# Patient Record
Sex: FEMALE | Race: WHITE | NOT HISPANIC OR LATINO | Employment: FULL TIME | URBAN - NONMETROPOLITAN AREA
[De-identification: names, ages, dates, MRNs, and addresses within clinical notes are randomized per-mention and may not be internally consistent; named-entity substitution may affect disease eponyms.]

---

## 2017-01-05 ENCOUNTER — HOSPITAL ENCOUNTER (OUTPATIENT)
Dept: RADIOLOGY | Facility: HOSPITAL | Age: 62
Discharge: HOME | End: 2017-01-05
Attending: PODIATRIST
Payer: COMMERCIAL

## 2017-01-05 ENCOUNTER — TRANSCRIBE ORDERS (OUTPATIENT)
Dept: ADMINISTRATIVE | Facility: HOSPITAL | Age: 62
End: 2017-01-05

## 2017-01-05 DIAGNOSIS — M79.671 RIGHT FOOT PAIN: ICD-10-CM

## 2017-01-05 DIAGNOSIS — M79.671 RIGHT FOOT PAIN: Primary | ICD-10-CM

## 2017-01-05 PROCEDURE — 73630 X-RAY EXAM OF FOOT: CPT

## 2017-04-18 ENCOUNTER — ALLSCRIPTS OFFICE VISIT (OUTPATIENT)
Dept: OTHER | Facility: OTHER | Age: 62
End: 2017-04-18

## 2017-04-19 ENCOUNTER — TRANSCRIBE ORDERS (OUTPATIENT)
Dept: ADMINISTRATIVE | Facility: HOSPITAL | Age: 62
End: 2017-04-19

## 2017-04-19 DIAGNOSIS — R10.9 ABDOMINAL PAIN, OTHER SPECIFIED SITE: Primary | ICD-10-CM

## 2017-04-19 DIAGNOSIS — R10.9 ABDOMINAL PAIN: ICD-10-CM

## 2017-04-20 ENCOUNTER — APPOINTMENT (OUTPATIENT)
Dept: LAB | Facility: HOSPITAL | Age: 62
End: 2017-04-20
Attending: SURGERY
Payer: COMMERCIAL

## 2017-04-20 DIAGNOSIS — R10.9 ABDOMINAL PAIN: ICD-10-CM

## 2017-04-20 LAB
ANION GAP SERPL CALCULATED.3IONS-SCNC: 8 MMOL/L (ref 4–13)
BUN SERPL-MCNC: 15 MG/DL (ref 5–25)
CALCIUM SERPL-MCNC: 8.4 MG/DL (ref 8.3–10.1)
CHLORIDE SERPL-SCNC: 107 MMOL/L (ref 100–108)
CO2 SERPL-SCNC: 28 MMOL/L (ref 21–32)
CREAT SERPL-MCNC: 0.72 MG/DL (ref 0.6–1.3)
GFR SERPL CREATININE-BSD FRML MDRD: >60 ML/MIN/1.73SQ M
GLUCOSE SERPL-MCNC: 98 MG/DL (ref 65–140)
POTASSIUM SERPL-SCNC: 4.4 MMOL/L (ref 3.5–5.3)
SODIUM SERPL-SCNC: 143 MMOL/L (ref 136–145)

## 2017-04-20 PROCEDURE — 36415 COLL VENOUS BLD VENIPUNCTURE: CPT

## 2017-04-20 PROCEDURE — 80048 BASIC METABOLIC PNL TOTAL CA: CPT

## 2017-04-25 ENCOUNTER — HOSPITAL ENCOUNTER (OUTPATIENT)
Dept: CT IMAGING | Facility: HOSPITAL | Age: 62
Discharge: HOME | End: 2017-04-25
Attending: SURGERY
Payer: COMMERCIAL

## 2017-04-25 DIAGNOSIS — R10.9 ABDOMINAL PAIN: ICD-10-CM

## 2017-04-25 PROCEDURE — 74177 CT ABD & PELVIS W/CONTRAST: CPT

## 2017-04-25 RX ADMIN — IOHEXOL 100 ML: 350 INJECTION, SOLUTION INTRAVENOUS at 08:34

## 2017-04-26 ENCOUNTER — GENERIC CONVERSION - ENCOUNTER (OUTPATIENT)
Dept: OTHER | Facility: OTHER | Age: 62
End: 2017-04-26

## 2017-05-24 ENCOUNTER — ALLSCRIPTS OFFICE VISIT (OUTPATIENT)
Dept: OTHER | Facility: OTHER | Age: 62
End: 2017-05-24

## 2017-06-05 ENCOUNTER — ALLSCRIPTS OFFICE VISIT (OUTPATIENT)
Dept: OTHER | Facility: OTHER | Age: 62
End: 2017-06-05

## 2017-06-05 DIAGNOSIS — I10 ESSENTIAL (PRIMARY) HYPERTENSION: ICD-10-CM

## 2017-06-05 DIAGNOSIS — Z00.00 ENCOUNTER FOR GENERAL ADULT MEDICAL EXAMINATION WITHOUT ABNORMAL FINDINGS: ICD-10-CM

## 2017-06-13 ENCOUNTER — TRANSCRIBE ORDERS (OUTPATIENT)
Dept: SLEEP CENTER | Facility: HOSPITAL | Age: 62
End: 2017-06-13

## 2017-06-13 DIAGNOSIS — G47.33 OBSTRUCTIVE SLEEP APNEA (ADULT) (PEDIATRIC): Primary | ICD-10-CM

## 2017-06-29 ENCOUNTER — HOSPITAL ENCOUNTER (OUTPATIENT)
Dept: SLEEP CENTER | Facility: HOSPITAL | Age: 62
Discharge: HOME | End: 2017-06-29
Attending: INTERNAL MEDICINE
Payer: COMMERCIAL

## 2017-06-29 DIAGNOSIS — G47.33 OBSTRUCTIVE SLEEP APNEA (ADULT) (PEDIATRIC): ICD-10-CM

## 2017-07-02 ENCOUNTER — TRANSCRIBE ORDERS (OUTPATIENT)
Dept: ADMINISTRATIVE | Facility: HOSPITAL | Age: 62
End: 2017-07-02

## 2017-07-02 ENCOUNTER — APPOINTMENT (OUTPATIENT)
Dept: LAB | Facility: HOSPITAL | Age: 62
End: 2017-07-02
Attending: INTERNAL MEDICINE
Payer: COMMERCIAL

## 2017-07-02 DIAGNOSIS — Z00.00 ENCOUNTER FOR GENERAL ADULT MEDICAL EXAMINATION WITHOUT ABNORMAL FINDINGS: ICD-10-CM

## 2017-07-02 DIAGNOSIS — I10 ESSENTIAL (PRIMARY) HYPERTENSION: ICD-10-CM

## 2017-07-02 LAB
CHOLEST SERPL-MCNC: 217 MG/DL (ref 50–200)
EST. AVERAGE GLUCOSE BLD GHB EST-MCNC: 111 MG/DL
HBA1C MFR BLD: 5.5 % (ref 4.2–6.3)
HDLC SERPL-MCNC: 72 MG/DL (ref 40–60)
LDLC SERPL CALC-MCNC: 118 MG/DL (ref 0–100)
TRIGL SERPL-MCNC: 134 MG/DL

## 2017-07-02 PROCEDURE — 83036 HEMOGLOBIN GLYCOSYLATED A1C: CPT

## 2017-07-02 PROCEDURE — 80061 LIPID PANEL: CPT

## 2017-07-02 PROCEDURE — 36415 COLL VENOUS BLD VENIPUNCTURE: CPT

## 2017-07-06 ENCOUNTER — TRANSCRIBE ORDERS (OUTPATIENT)
Dept: SLEEP CENTER | Facility: HOSPITAL | Age: 62
End: 2017-07-06

## 2017-07-06 DIAGNOSIS — G47.33 OBSTRUCTIVE SLEEP APNEA (ADULT) (PEDIATRIC): Primary | ICD-10-CM

## 2017-11-15 ENCOUNTER — GENERIC CONVERSION - ENCOUNTER (OUTPATIENT)
Dept: OTHER | Facility: OTHER | Age: 62
End: 2017-11-15

## 2017-12-07 ENCOUNTER — ALLSCRIPTS OFFICE VISIT (OUTPATIENT)
Dept: OTHER | Facility: OTHER | Age: 62
End: 2017-12-07

## 2017-12-08 NOTE — PROGRESS NOTES
Assessment    1  Screening for depression (V79 0) (Z13 89)   2  Never a smoker   3  Hypertension (401 9) (I10)   4  Anxiety (300 00) (F41 9)   5  Obstructive sleep apnea (327 23) (G47 33)    Plan  Anxiety    · ALPRAZolam 0 5 MG Oral Tablet; one po daily as needed  Screening for depression    · *VB-Depression Screening; Status:Complete;   Done: [id number] 03:24PM    Discussion/Summary  Discussion Summary:   Refill alprazolam which pt takes sparingly  She is waiting as able for hernia repair  Reviewed labs  Rx mammo  Rto 6 months  Medication SE Review and Pt Understands Tx: Possible side effects of new medications were reviewed with the patient/guardian today  The treatment plan was reviewed with the patient/guardian  The patient/guardian understands and agrees with the treatment plan    Self Referrals:   Self Referrals: No      Chief Complaint  Chief Complaint Free Text Note Form: Pt presents for routine f/up exam  Pt feels well today and without complaint  No falls  Asking for refill of alprazolam  Appetite is normal per patient  Chief Complaint Chronic Condition [facility]: Patient is here today for follow up of chronic conditions described in HPI  History of Present Illness  HPI: Pt doing ok  Feels wellbutrin has been helpful  She saw sx regarding hernia but not ready for sx now  Using cpap daily with benefit  No respiratory sxs  Review of Systems  Complete-Female:  Constitutional: No fever, no chills, feels well, no tiredness, no recent weight gain or weight loss  Eyes: No complaints of eye pain, no red eyes, no eyesight problems, no discharge, no dry eyes, no itching of eyes  ENT: no complaints of earache, no loss of hearing, no nose bleeds, no nasal discharge, no sore throat, no hoarseness  Cardiovascular: No complaints of slow heart rate, no fast heart rate, no chest pain, no palpitations, no leg claudication, no lower extremity edema  Respiratory: shortness of breath during exertion  Gastrointestinal: No complaints of abdominal pain, no constipation, no nausea or vomiting, no diarrhea, no bloody stools  Genitourinary: No complaints of dysuria, no incontinence, no pelvic pain, no dysmenorrhea, no vaginal discharge or bleeding  Musculoskeletal: No complaints of arthralgias, no myalgias, no joint swelling or stiffness, no limb pain or swelling  Integumentary: No complaints of skin rash or lesions, no itching, no skin wounds, no breast pain or lump  Neurological: No complaints of headache, no confusion, no convulsions, no numbness, no dizziness or fainting, no tingling, no limb weakness, no difficulty walking  Psychiatric: Not suicidal, no sleep disturbance, no anxiety or depression, no change in personality, no emotional problems  Endocrine: No complaints of proptosis, no hot flashes, no muscle weakness, no deepening of the voice, no feelings of weakness  Hematologic/Lymphatic: No complaints of swollen glands, no swollen glands in the neck, does not bleed easily, does not bruise easily  Active Problems  1  Abdominal pain (789 00) (R10 9)   2  Anxiety (300 00) (F41 9)   3  Counseling regarding advanced directives (V65 49) (Z71 89)   4  Encounter for screening mammogram for malignant neoplasm of breast (V76 12) (Z12 31)   5  Esophageal reflux (530 81) (K21 9)   6  Hypertension (401 9) (I10)   7  Nausea and vomiting (787 01) (R11 2)   8  Nephrolithiasis (592 0) (N20 0)   9  No advance directives (V49 89) (Z78 9)   10  Obstructive sleep apnea (327 23) (G47 33)   11  Osteoporosis (733 00) (M81 0)   12  Thyroid nodule (241 0) (E04 1)   13  Umbilical hernia (249 1) (K42 9)    Past Medical History  1  History of Abdominal bloating (787 3) (R14 0)   2  History of hyperglycemia (V12 29) (Z86 39)   3  History of Medullary sponge kidney (753 17) (Q61 5)  Active Problems And Past Medical History Reviewed: The active problems and past medical history were reviewed and updated today        Surgical Allergies    Vitals  Vital Signs    Recorded: [id number] 09:01PM Recorded: [id number] 03:18PM   Temperature  95 3 F, Tympanic   Heart Rate  74   Systolic 636    Diastolic 72    Height  5 ft    Weight  124 lb 2 oz   BMI Calculated  24 24   BSA Calculated  1 52   O2 Saturation  98, RA       Physical Exam   Constitutional  General appearance: No acute distress, well appearing and well nourished  Eyes  Conjunctiva and lids: No swelling, erythema or discharge  Pupils and irises: Equal, round and reactive to light  Ears, Nose, Mouth, and Throat  External inspection of ears and nose: Normal    Otoscopic examination: Tympanic membranes translucent with normal light reflex  Canals patent without erythema  Nasal mucosa, septum, and turbinates: Normal without edema or erythema  Oropharynx: Normal with no erythema, edema, exudate or lesions  Pulmonary  Respiratory effort: No increased work of breathing or signs of respiratory distress  Auscultation of lungs: Clear to auscultation  Cardiovascular  Palpation of heart: Normal PMI, no thrills  Auscultation of heart: Normal rate and rhythm, normal S1 and S2, without murmurs  Examination of extremities for edema and/or varicosities: Normal    Carotid pulses: Normal    Abdomen  Abdomen: Abnormal  -- ventral hernia nt  Liver and spleen: No hepatomegaly or splenomegaly  Lymphatic  Palpation of lymph nodes in neck: No lymphadenopathy  Musculoskeletal  Gait and station: Normal    Digits and nails: Normal without clubbing or cyanosis  Inspection/palpation of joints, bones, and muscles: Normal    Skin  Skin and subcutaneous tissue: Normal without rashes or lesions  Neurologic  Cranial nerves: Cranial nerves 2-12 intact  Reflexes: 2+ and symmetric  Sensation: No sensory loss     Psychiatric  Orientation to person, place, and time: Normal    Mood and affect: Normal          Results/Data  PHQ-2 Adult Depression Screening [id number] 03:24PM User, Le Cicognes     Test Name Result Flag Reference   PHQ-2 Adult Depression Score 0       Over the last two weeks, how often have you been bothered by any of the following problems?  Little interest or pleasure in doing things: Not at all - 0 Feeling down, depressed, or hopeless: Not at all - 0   PHQ-2 Adult Depression Screening Negative       *VB-Depression Screening [id number] 03:24PM Concepcion Khoury     Test Name Result Flag Reference   Depression Scale Result      Depression Screen - Negative For Symptoms         Signatures   Electronically signed by : De Buckley DO; Dec  7 2017  9:03PM EST                       (Author)

## 2018-01-11 NOTE — RESULT NOTES
Verified Results  * XR ABDOMEN 1 VIEW KUB [id number] 04:09PM Carlos Nielson Order Number: [id number]    Order Number: [id number]     Test Name Result Flag Reference   XR ABDOMEN 1 VIEW KUB (Report)     ABDOMEN     INDICATION: History of previous calculus  COMPARISON: October 21, 2015     VIEWS: AP supine; 1 image     FINDINGS: The right ureteral stent has been removed in the interval      No renal calculi are seen  No ureteral calculi identified  Nonobstructive bowel gas pattern  Surgical clips overlie the left pelvic brim  Osseous structures appear intact  IMPRESSION:     No urinary tract calculi  Workstation performed: [id number]     Signed by:   Malissa Don MD   11/10/16

## 2018-01-12 NOTE — RESULT NOTES
Verified Results  * CT ABDOMEN PELVIS W CONTRAST 25Apr2017 07:57AM Dom Thompson Order Number: [id number]   Performing Comments: AUTHORIZATION#[id number] VALID 4-19-17 TO 6-18-17 [facility]   - Patient Instructions: To schedule this appointment, please contact Central Scheduling at [phone]  Test Name Result Flag Reference   CT ABDOMEN PELVIS W CONTRAST (Report)     CT ABDOMEN AND PELVIS WITH IV CONTRAST     INDICATION: Abdominal pain  COMPARISON: September 28, 2015     TECHNIQUE: CT examination of the abdomen and pelvis was performed  Reformatted images were created in axial, sagittal, and coronal planes  Radiation dose length product (DLP) for this visit: 377 44 mGy-cm   This examination, like all CT scans performed in the [facility], was performed utilizing techniques to minimize radiation dose exposure, including the use of iterative   reconstruction and automated exposure control  IV Contrast: 100 mL of iohexol (OMNIPAQUE)      Enteric Contrast: Enteric contrast was administered  FINDINGS:     ABDOMEN     LOWER CHEST: Minimal linear atelectasis or scarring, left lung base  LIVER/BILIARY TREE: Liver measures 16 5 cm in height, mildly and diffusely diminished in attenuation without masses or dilated biliary ducts  GALLBLADDER: No calcified gallstones  No pericholecystic inflammatory change  SPLEEN: Unremarkable  PANCREAS: Unremarkable  ADRENAL GLANDS: Unremarkable  KIDNEYS/URETERS: Unremarkable  No hydronephrosis  STOMACH AND BOWEL: Stomach and small bowel unremarkable  Occasional colonic diverticula without evidence of acute diverticulitis  APPENDIX: No findings to suggest appendicitis  ABDOMINOPELVIC CAVITY: No ascites or free intraperitoneal air  No lymphadenopathy  Surgical clips are identified in the left side of the pelvis  VESSELS: Unremarkable for patient's age       PELVIS REPRODUCTIVE ORGANS: Hysterectomy  URINARY BLADDER: Unremarkable  ABDOMINAL WALL/INGUINAL REGIONS: Small fat-containing umbilical hernia  OSSEOUS STRUCTURES: No acute fracture or destructive osseous lesion  IMPRESSION:     No findings to explain the patient's abdominal pain  Colonic diverticular disease without evidence of acute diverticulitis  Small fat-containing umbilical hernia  Workstation performed: [id number]     Signed by:   Marilynn Espinoza MD   4/25/17

## 2018-01-13 VITALS
HEIGHT: 60 IN | SYSTOLIC BLOOD PRESSURE: 141 MMHG | TEMPERATURE: 97 F | DIASTOLIC BLOOD PRESSURE: 62 MMHG | HEART RATE: 70 BPM | WEIGHT: 123.38 LBS | BODY MASS INDEX: 24.22 KG/M2

## 2018-01-14 VITALS
HEIGHT: 60 IN | TEMPERATURE: 96.9 F | HEART RATE: 68 BPM | SYSTOLIC BLOOD PRESSURE: 130 MMHG | OXYGEN SATURATION: 98 % | DIASTOLIC BLOOD PRESSURE: 78 MMHG | WEIGHT: 121.25 LBS | BODY MASS INDEX: 23.81 KG/M2

## 2018-01-14 VITALS
HEIGHT: 60 IN | BODY MASS INDEX: 24.22 KG/M2 | HEART RATE: 98 BPM | DIASTOLIC BLOOD PRESSURE: 98 MMHG | SYSTOLIC BLOOD PRESSURE: 127 MMHG | TEMPERATURE: 97.1 F | WEIGHT: 123.38 LBS

## 2018-01-22 VITALS
HEIGHT: 60 IN | BODY MASS INDEX: 24.37 KG/M2 | TEMPERATURE: 95.3 F | HEART RATE: 74 BPM | DIASTOLIC BLOOD PRESSURE: 72 MMHG | OXYGEN SATURATION: 98 % | WEIGHT: 124.13 LBS | SYSTOLIC BLOOD PRESSURE: 126 MMHG

## 2018-05-22 DIAGNOSIS — F32.A DEPRESSION, UNSPECIFIED DEPRESSION TYPE: ICD-10-CM

## 2018-05-22 DIAGNOSIS — I10 HYPERTENSION, UNSPECIFIED TYPE: Primary | ICD-10-CM

## 2018-05-22 RX ORDER — ATENOLOL 50 MG/1
50 TABLET ORAL DAILY
Qty: 90 TABLET | Refills: 3 | Status: SHIPPED | OUTPATIENT
Start: 2018-05-22 | End: 2019-06-04 | Stop reason: SDUPTHER

## 2018-05-22 RX ORDER — LISINOPRIL 10 MG/1
10 TABLET ORAL DAILY
Qty: 90 TABLET | Refills: 3 | Status: SHIPPED | OUTPATIENT
Start: 2018-05-22 | End: 2019-06-04 | Stop reason: SDUPTHER

## 2018-05-22 RX ORDER — ATENOLOL 50 MG/1
1 TABLET ORAL DAILY
COMMUNITY
Start: 2015-08-15 | End: 2018-05-22 | Stop reason: SDUPTHER

## 2018-05-22 RX ORDER — LISINOPRIL 10 MG/1
1 TABLET ORAL DAILY
COMMUNITY
Start: 2015-08-15 | End: 2018-05-22 | Stop reason: SDUPTHER

## 2018-05-22 RX ORDER — BUPROPION HYDROCHLORIDE 75 MG/1
75 TABLET ORAL DAILY
Qty: 90 TABLET | Refills: 3 | Status: SHIPPED | OUTPATIENT
Start: 2018-05-22 | End: 2019-06-04 | Stop reason: SDUPTHER

## 2018-05-22 RX ORDER — BUPROPION HYDROCHLORIDE 75 MG/1
TABLET ORAL DAILY
COMMUNITY
Start: 2015-06-17 | End: 2018-05-22 | Stop reason: SDUPTHER

## 2018-06-21 ENCOUNTER — OFFICE VISIT (OUTPATIENT)
Dept: SLEEP CENTER | Facility: CLINIC | Age: 63
End: 2018-06-21
Attending: INTERNAL MEDICINE
Payer: COMMERCIAL

## 2018-06-21 VITALS
SYSTOLIC BLOOD PRESSURE: 110 MMHG | RESPIRATION RATE: 97 BRPM | WEIGHT: 124 LBS | HEART RATE: 74 BPM | BODY MASS INDEX: 24.35 KG/M2 | HEIGHT: 60 IN | DIASTOLIC BLOOD PRESSURE: 64 MMHG

## 2018-06-21 DIAGNOSIS — F51.12 INSUFFICIENT SLEEP SYNDROME: ICD-10-CM

## 2018-06-21 DIAGNOSIS — G47.9 SLEEP DISTURBANCE: ICD-10-CM

## 2018-06-21 DIAGNOSIS — I10 ESSENTIAL HYPERTENSION: ICD-10-CM

## 2018-06-21 DIAGNOSIS — G47.33 OBSTRUCTIVE SLEEP APNEA: Primary | ICD-10-CM

## 2018-06-21 DIAGNOSIS — R00.2 PALPITATIONS: ICD-10-CM

## 2018-06-21 DIAGNOSIS — F41.9 ANXIETY: ICD-10-CM

## 2018-06-21 DIAGNOSIS — R68.2 DRY MOUTH: ICD-10-CM

## 2018-06-21 PROCEDURE — 99214 OFFICE O/P EST MOD 30 MIN: CPT | Performed by: INTERNAL MEDICINE

## 2018-06-21 RX ORDER — ALPRAZOLAM 0.5 MG/1
1 TABLET ORAL DAILY
COMMUNITY
Start: 2016-04-14 | End: 2018-06-26 | Stop reason: SDUPTHER

## 2018-06-21 RX ORDER — ACETAMINOPHEN 160 MG
5000 TABLET,DISINTEGRATING ORAL
COMMUNITY

## 2018-06-21 NOTE — PROGRESS NOTES
Review of Systems      Genitourinary none   Cardiology none   Gastrointestinal none   Neurology none   Constitutional weight change   Integumentary none   Psychiatry none   Musculoskeletal none   Pulmonary snoring   ENT none   Endocrine none   Hematological none

## 2018-06-21 NOTE — PROGRESS NOTES
Follow-Up Note - [facility]   Sima Mendieta  57 y o  female  SPF:[birth year]  DXV:[id number]    CC: I saw this patient for follow-up in clinic today for her Sleep Disordered Breathing, Coexisting Sleep and Medical Problems  She is complaining of more frequent nocturnal awakenings and at times associated with palpitations  PFSH, Problem List, Medications & Allergies were reviewed in EMR  Interval changes: [none reported ]   She  has a past medical history of Hyperglycemia and Medullary sponge kidney  She has a current medication list which includes the following prescription(s): atenolol, bupropion, lisinopril, alprazolam, bimatoprost, and vitamin d3  ROS: Reviewed (see attached)  She reports 6-7 lb weight gain in the past 1 year  She is no longer requiring medication for acid reflux  HPI:  With respect to sleep disordered breathing, compliance data download shows:  using PAP > 4 hours per night 92% of the time  SUSANA (estimated) 4/hourand at [90th percentile] pressure of 8cm H2O     Tracee reports  · Benefitting from use:    · no  difficulty tolerating PAP;   · some adverse effects:  Snoring her with CPAP, dry mouth    Sleep Routine:  She reports getting 6 hr sleep on week nights and a little more on weekends; she  has no difficulty initiating, but reports diffculty maintaining sleep   She has interruptions  of sleep 4-5 times a night  She awakens spontaneously feeling refreshed  She denies excessive drowsiness [ ]  [She rated herself at Total score: 6 /24 on the [place] sleepiness scale ]      EXAM: Patient is alert, orientated, cooperative [and in no distress]  Mental state [appears normal]  There are [no] facial pressure marks or rashes     /64 (BP Location: Right arm, Patient Position: Sitting, Cuff Size: Large)   Pulse 74   Resp (!) 97   Ht 5' (1 524 m)   Wt 56 2 kg (124 lb)   BMI 24 22 kg/m²      Wt Readings from Last 3 Encounters:   06/21/18 56 2 kg (124 lb)   12/07/17 56 3 kg (124 lb 2 1 oz)   06/05/17 55 kg (121 lb 4 oz)     Physical findings are essentially unchanged as documented in the initial encounter  IMPRESSION:   1  Obstructive sleep apnea  Sleep F/U  - established patient    Cpap DME   2  Sleep disturbance     3  Insufficient sleep syndrome     4  Anxiety     5  Dry mouth     6  Essential hypertension     7  Palpitations         PLAN:  1  I reviewed results of the Sleep studies with the patient:  She has moderately severe obstructive sleep apnea through diagnostic study in 2006 that demonstrated AHI of 20 per hour  She had several titration studies the last in 2015 that demonstrated a pressure of 8 cm H2O was sufficient to remediated sleep disordered breathing  2  I discussed treatment options  A retitration study is not warranted at this time  However, her pressure may be increased using auto titrating Pap      3  Treatment with  PAP is medically necessary and Jacque Johnston is agreable to continue use  4  Pressure setting:  Continue at 8-12 cm H2O in auto titrating mode  5  Instruction on care of equipment and strategies to improve comfort with use of PAP were discussed [:controlling mucosal dryness, nasal symptoms and Mask leaks]  6  Care coordinated with DME provider and prescription to replace supplies as needed was provided  7  Need for compliance with therapy was emphasized  I also advised increasing the amount of sleep that she gets  8  She may need reviewing of her medications for anxiety  9  If symptoms persist, a repeat titration study may be considered  10  With your consent, follow-up is advised in [1 [place] Kicks [or sooner if needed] [to monitor progress, compliance and to adjust therapy]  Thank you for allowing me to participate in the care of this patient      Sincerely,    Authenticated electronically by Merissa Sharma MD on 05/71/76   Board Certified Specialist

## 2018-06-26 ENCOUNTER — OFFICE VISIT (OUTPATIENT)
Dept: INTERNAL MEDICINE CLINIC | Facility: CLINIC | Age: 63
End: 2018-06-26
Payer: COMMERCIAL

## 2018-06-26 VITALS
TEMPERATURE: 95.9 F | SYSTOLIC BLOOD PRESSURE: 126 MMHG | HEIGHT: 60 IN | OXYGEN SATURATION: 97 % | BODY MASS INDEX: 24.15 KG/M2 | DIASTOLIC BLOOD PRESSURE: 70 MMHG | HEART RATE: 65 BPM | WEIGHT: 123 LBS

## 2018-06-26 DIAGNOSIS — F41.9 ANXIETY: ICD-10-CM

## 2018-06-26 DIAGNOSIS — M85.9 LOW BONE DENSITY: ICD-10-CM

## 2018-06-26 DIAGNOSIS — Z00.00 WELLNESS EXAMINATION: Primary | ICD-10-CM

## 2018-06-26 DIAGNOSIS — I10 ESSENTIAL HYPERTENSION: ICD-10-CM

## 2018-06-26 DIAGNOSIS — G47.33 OBSTRUCTIVE SLEEP APNEA: ICD-10-CM

## 2018-06-26 DIAGNOSIS — E78.2 MIXED HYPERLIPIDEMIA: ICD-10-CM

## 2018-06-26 DIAGNOSIS — M81.0 AGE-RELATED OSTEOPOROSIS WITHOUT CURRENT PATHOLOGICAL FRACTURE: ICD-10-CM

## 2018-06-26 PROCEDURE — 99214 OFFICE O/P EST MOD 30 MIN: CPT | Performed by: INTERNAL MEDICINE

## 2018-06-26 PROCEDURE — 3078F DIAST BP <80 MM HG: CPT | Performed by: INTERNAL MEDICINE

## 2018-06-26 PROCEDURE — 3074F SYST BP LT 130 MM HG: CPT | Performed by: INTERNAL MEDICINE

## 2018-06-26 RX ORDER — ALPRAZOLAM 0.5 MG/1
0.5 TABLET ORAL DAILY
Qty: 90 TABLET | Refills: 0 | Status: SHIPPED | OUTPATIENT
Start: 2018-06-26 | End: 2018-12-18 | Stop reason: SDUPTHER

## 2018-06-26 NOTE — PROGRESS NOTES
Assessment/Plan:         Diagnoses and all orders for this visit:    Low bone density  -     DXA bone density spine hip and pelvis; Future    Anxiety  -     ALPRAZolam (XANAX) 0 5 mg tablet; Take 1 tablet (0 5 mg total) by mouth daily for 90 days    Essential hypertension  No changes no added salt diet    Obstructive sleep apnea  She has follow up with sleep lab for settings but overall doing well    Age-related osteoporosis without current pathological fracture  Rx dexa and pt will schedule    Other orders  -     ALPRAZolam (XANAX) 0 5 mg tablet; Take 1 tablet by mouth daily  -     Discontinue: bimatoprost (LUMIGAN) 0 01 % ophthalmic drops; Apply 1 drop to eye  -     Cholecalciferol (VITAMIN D3) 2000 units capsule; Take by mouth    Rto 6 months     Patient ID: Pelon Handley is a 57 y o  female  HPI   Pt has been ok overall  Cpap working  Has leg cramps at times and does not drink a lot regularly  She has had more anxiety and her  has been declining  She has an achilles injury and did see Dr Kenneth Cook     The following portions of the patient's history were reviewed and updated as appropriate:   Social History     Social History    Marital status: /Civil Union     Spouse name: N/A    Number of children: N/A    Years of education: N/A     Occupational History    Not on file       Social History Main Topics    Smoking status: Never Smoker    Smokeless tobacco: Never Used    Alcohol use No    Drug use: No    Sexual activity: Not on file     Other Topics Concern    Not on file     Social History Narrative    Dental care occasionally    Lives with spouse    No advance directives    No caffeine use    Sun screen worn    Uses seatbelt     Past Medical History:   Diagnosis Date    Hyperglycemia     last assessed 16    Medullary sponge kidney      Past Surgical History:   Procedure Laterality Date    CATARACT EXTRACTION Left 2011     SECTION      unknown onset    CYSTOSCOPY W/ URETERAL STENT PLACEMENT Right     CYSTOSCOPY W/ URETERAL STENT REMOVAL Right 10/27/2015    right stent    DILATION AND CURETTAGE OF UTERUS      of cervical stump unknown onset    OOPHORECTOMY Left     unknown onset    TOTAL ABDOMINAL HYSTERECTOMY      w/ removal of both ovaries, last assessed 8/21/14     No Known Allergies      Review of Systems   Constitutional: Negative  HENT: Negative  Eyes: Negative  Respiratory: Negative  Cardiovascular: Negative  Gastrointestinal: Negative  Endocrine: Negative  Genitourinary: Negative  Musculoskeletal: Negative  Skin: Negative  Allergic/Immunologic: Negative  Neurological: Negative  Hematological: Negative  Psychiatric/Behavioral: Negative  /70   Pulse 65   Temp (!) 95 9 °F (35 5 °C) (Tympanic)   Ht 5' (1 524 m)   Wt 55 8 kg (123 lb)   SpO2 97%   BMI 24 02 kg/m²      Physical Exam   Constitutional: She is oriented to person, place, and time  She appears well-developed and well-nourished  No distress  HENT:   Head: Normocephalic and atraumatic  Right Ear: External ear normal    Left Ear: External ear normal    Nose: Nose normal    Mouth/Throat: Oropharynx is clear and moist  No oropharyngeal exudate  Eyes: Conjunctivae and EOM are normal  Pupils are equal, round, and reactive to light  No scleral icterus  Neck: Normal range of motion  Neck supple  No JVD present  Cardiovascular: Normal rate, regular rhythm, normal heart sounds and intact distal pulses  Pulmonary/Chest: Effort normal and breath sounds normal    Abdominal: Soft  Bowel sounds are normal    Musculoskeletal: Normal range of motion  She exhibits tenderness  She exhibits no edema or deformity  Tenderness right achilles   Lymphadenopathy:     She has no cervical adenopathy  Neurological: She is alert and oriented to person, place, and time  She has normal reflexes  Skin: Skin is warm and dry  She is not diaphoretic     Psychiatric: She has a normal mood and affect  Her behavior is normal  Judgment and thought content normal    Nursing note and vitals reviewed

## 2018-06-30 ENCOUNTER — TRANSCRIBE ORDERS (OUTPATIENT)
Dept: ADMINISTRATIVE | Facility: HOSPITAL | Age: 63
End: 2018-06-30

## 2018-06-30 ENCOUNTER — APPOINTMENT (OUTPATIENT)
Dept: LAB | Facility: HOSPITAL | Age: 63
End: 2018-06-30

## 2018-06-30 DIAGNOSIS — Z00.8 HEALTH EXAMINATION IN POPULATION SURVEY: ICD-10-CM

## 2018-06-30 DIAGNOSIS — Z00.8 HEALTH EXAMINATION IN POPULATION SURVEY: Primary | ICD-10-CM

## 2018-06-30 LAB
CHOLEST SERPL-MCNC: 218 MG/DL (ref 50–200)
EST. AVERAGE GLUCOSE BLD GHB EST-MCNC: 117 MG/DL
HBA1C MFR BLD: 5.7 % (ref 4.2–6.3)
HDLC SERPL-MCNC: 61 MG/DL (ref 40–60)
LDLC SERPL CALC-MCNC: 139 MG/DL (ref 0–100)
NONHDLC SERPL-MCNC: 157 MG/DL
TRIGL SERPL-MCNC: 88 MG/DL

## 2018-06-30 PROCEDURE — 36415 COLL VENOUS BLD VENIPUNCTURE: CPT

## 2018-06-30 PROCEDURE — 80061 LIPID PANEL: CPT

## 2018-06-30 PROCEDURE — 83036 HEMOGLOBIN GLYCOSYLATED A1C: CPT

## 2018-09-12 ENCOUNTER — OFFICE VISIT (OUTPATIENT)
Dept: SURGERY | Facility: HOSPITAL | Age: 63
End: 2018-09-12
Payer: COMMERCIAL

## 2018-09-12 VITALS
HEIGHT: 60 IN | TEMPERATURE: 98.1 F | BODY MASS INDEX: 23.75 KG/M2 | HEART RATE: 64 BPM | DIASTOLIC BLOOD PRESSURE: 91 MMHG | SYSTOLIC BLOOD PRESSURE: 141 MMHG | WEIGHT: 121 LBS

## 2018-09-12 DIAGNOSIS — K42.9 UMBILICAL HERNIA WITHOUT OBSTRUCTION AND WITHOUT GANGRENE: Primary | ICD-10-CM

## 2018-09-12 DIAGNOSIS — M85.9 LOW BONE DENSITY: ICD-10-CM

## 2018-09-12 PROCEDURE — 99214 OFFICE O/P EST MOD 30 MIN: CPT | Performed by: SURGERY

## 2018-09-24 PROBLEM — K42.9 UMBILICAL HERNIA WITHOUT OBSTRUCTION AND WITHOUT GANGRENE: Status: ACTIVE | Noted: 2018-09-24

## 2018-09-24 NOTE — ASSESSMENT & PLAN NOTE
In symptomatic umbilical hernia  Patient is to travel to [facility] in the near future  She will follow up with me when she returns to discuss and schedule repair of the umbilical hernia  Incarceration strangulation were discussed with the patient  She is aware that she should seek medical attention immediately if she has any worsening symptoms

## 2018-09-24 NOTE — PROGRESS NOTES
Assessment/Plan:    Umbilical hernia without obstruction and without gangrene  In symptomatic umbilical hernia  Patient is to travel to [facility] in the near future  She will follow up with me when she returns to discuss and schedule repair of the umbilical hernia  Incarceration strangulation were discussed with the patient  She is aware that she should seek medical attention immediately if she has any worsening symptoms  Diagnoses and all orders for this visit:    Umbilical hernia without obstruction and without gangrene    Low bone density  -     DXA bone density spine hip and pelvis          Subjective:      Patient ID: Arturo Hmyan is a 70 y o  female  26-year-old female presents today for evaluation of umbilical hernia  The patient has a known umbilical hernia which is occasionally symptomatic  She has put off repair due to the minimal discomfort that she has and also for logistical regions  However patient is traveling abroad to [facility] and therefore wanted to be evaluated prior to going  Denies any nausea vomiting  No fevers or chills  Does occasionally have some intermittent abdominal pain with increased activity especially while working  No changes in bowel bladder habits  She is passing flatus  The following portions of the patient's history were reviewed and updated as appropriate:   She  has a past medical history of Hyperglycemia and Medullary sponge kidney    She   Patient Active Problem List    Diagnosis Date Noted    Umbilical hernia without obstruction and without gangrene 09/24/2018    Obstructive sleep apnea 06/21/2018    Sleep disturbance 06/21/2018    Insufficient sleep syndrome 06/21/2018    Anxiety 06/21/2018    Hypertension 06/21/2018    Palpitations 06/21/2018    Dry mouth 06/21/2018    Nephrolithiasis 10/12/2015    Esophageal reflux 07/16/2012    Osteoporosis 07/16/2012     She  has a past surgical history that includes Cataract extraction (Left, 01/2011);  section; Cystoscopy w/ ureteral stent placement (Right); Cystoscopy w/ ureteral stent removal (Right, 10/27/2015); Dilation and curettage of uterus; Oophorectomy (Left); and Total abdominal hysterectomy  Her family history includes Coronary artery disease in her mother; Diabetes in her family; Hypertension in her mother; Stroke in her mother  She  reports that she has never smoked  She has never used smokeless tobacco  She reports that she does not drink alcohol or use drugs  Current Outpatient Prescriptions   Medication Sig Dispense Refill    ALPRAZolam (XANAX) 0 5 mg tablet Take 1 tablet (0 5 mg total) by mouth daily for 90 days 90 tablet 0    atenolol (TENORMIN) 50 mg tablet Take 1 tablet (50 mg total) by mouth daily 90 tablet 3    buPROPion (WELLBUTRIN) 75 mg tablet Take 1 tablet (75 mg total) by mouth daily 90 tablet 3    Cholecalciferol (VITAMIN D3) 2000 units capsule Take by mouth      lisinopril (ZESTRIL) 10 mg tablet Take 1 tablet (10 mg total) by mouth daily 90 tablet 3     No current facility-administered medications for this visit  She has No Known Allergies       Review of Systems      A 10 point review systems was conducted, all negative except as noted above HPI  Objective:      /91   Pulse 64   Temp 98 1 °F (36 7 °C)   Ht 5' (1 524 m)   Wt 54 9 kg (121 lb)   BMI 23 63 kg/m²          Physical Exam   Constitutional: She is oriented to person, place, and time  She appears well-developed and well-nourished  No distress  HENT:   Head: Normocephalic and atraumatic  Eyes: No scleral icterus  Neck: Normal range of motion  Neck supple  No tracheal deviation present  Cardiovascular: Normal rate, regular rhythm and normal heart sounds  Exam reveals no gallop and no friction rub  No murmur heard  Pulmonary/Chest: Effort normal and breath sounds normal  No respiratory distress  She has no wheezes  She has no rales  She exhibits no tenderness  Abdominal: Soft   She exhibits no distension and no mass  There is tenderness (Mild tenderness with deep palpation at the site of the umbilical hernia )  There is no rebound and no guarding  And small palpable umbilical hernia  Musculoskeletal: Normal range of motion  She exhibits no edema, tenderness or deformity  Lymphadenopathy:     She has no cervical adenopathy  Neurological: She is alert and oriented to person, place, and time  No cranial nerve deficit  Skin: Skin is warm  No rash noted  She is not diaphoretic  No erythema  No pallor  Psychiatric: She has a normal mood and affect  Her behavior is normal    Vitals reviewed

## 2018-10-02 ENCOUNTER — OFFICE VISIT (OUTPATIENT)
Dept: SURGERY | Facility: HOSPITAL | Age: 63
End: 2018-10-02
Payer: COMMERCIAL

## 2018-10-02 VITALS
WEIGHT: 127 LBS | TEMPERATURE: 98.5 F | DIASTOLIC BLOOD PRESSURE: 81 MMHG | BODY MASS INDEX: 24.94 KG/M2 | HEART RATE: 74 BPM | SYSTOLIC BLOOD PRESSURE: 123 MMHG | HEIGHT: 60 IN

## 2018-10-02 DIAGNOSIS — K42.9 UMBILICAL HERNIA WITHOUT OBSTRUCTION AND WITHOUT GANGRENE: Primary | ICD-10-CM

## 2018-10-02 PROCEDURE — 99213 OFFICE O/P EST LOW 20 MIN: CPT | Performed by: SURGERY

## 2018-10-02 NOTE — LETTER
October 5, 2018     Patient: Keagan Corral   YOB: 1955   Date of Visit: 10/2/2018       To Whom it May Concern:    Keagan Corral is under my professional care  She was seen in my office on 10/2/2018  She will behaving a umbilical hernia repaired surgically on 10/23/2018  She will need to remain off from work starting 10/23/2018 until 6-8 weeks post op, until medically cleared, and re-evaluated at her post op appointments to return back to work  If you have any questions or concerns, please don't hesitate to call           Sincerely,          Jon Back, DO        CC: Keagan Corral

## 2018-10-05 ENCOUNTER — DOCUMENTATION (OUTPATIENT)
Dept: SURGERY | Facility: HOSPITAL | Age: 63
End: 2018-10-05

## 2018-10-11 ENCOUNTER — TRANSCRIBE ORDERS (OUTPATIENT)
Dept: ADMINISTRATIVE | Facility: HOSPITAL | Age: 63
End: 2018-10-11

## 2018-10-11 ENCOUNTER — HOSPITAL ENCOUNTER (OUTPATIENT)
Dept: NON INVASIVE DIAGNOSTICS | Facility: HOSPITAL | Age: 63
Discharge: HOME | End: 2018-10-11
Attending: SURGERY
Payer: COMMERCIAL

## 2018-10-11 DIAGNOSIS — K42.9 UMBILICAL HERNIA WITHOUT OBSTRUCTION AND WITHOUT GANGRENE: ICD-10-CM

## 2018-10-11 PROCEDURE — 93005 ELECTROCARDIOGRAM TRACING: CPT

## 2018-10-13 ENCOUNTER — APPOINTMENT (OUTPATIENT)
Dept: LAB | Facility: HOSPITAL | Age: 63
End: 2018-10-13
Attending: SURGERY
Payer: COMMERCIAL

## 2018-10-13 DIAGNOSIS — K42.9 UMBILICAL HERNIA WITHOUT OBSTRUCTION AND WITHOUT GANGRENE: ICD-10-CM

## 2018-10-13 LAB
ALBUMIN SERPL BCP-MCNC: 3.5 G/DL (ref 3.5–5)
ALP SERPL-CCNC: 106 U/L (ref 46–116)
ALT SERPL W P-5'-P-CCNC: 33 U/L (ref 12–78)
ANION GAP SERPL CALCULATED.3IONS-SCNC: 10 MMOL/L (ref 4–13)
AST SERPL W P-5'-P-CCNC: 21 U/L (ref 5–45)
BASOPHILS # BLD AUTO: 0.03 THOUSANDS/ΜL (ref 0–0.1)
BASOPHILS NFR BLD AUTO: 1 % (ref 0–1)
BILIRUB SERPL-MCNC: 0.4 MG/DL (ref 0.2–1)
BUN SERPL-MCNC: 14 MG/DL (ref 5–25)
CALCIUM SERPL-MCNC: 8.6 MG/DL (ref 8.3–10.1)
CHLORIDE SERPL-SCNC: 107 MMOL/L (ref 100–108)
CO2 SERPL-SCNC: 27 MMOL/L (ref 21–32)
CREAT SERPL-MCNC: 0.81 MG/DL (ref 0.6–1.3)
EOSINOPHIL # BLD AUTO: 0.06 THOUSAND/ΜL (ref 0–0.61)
EOSINOPHIL NFR BLD AUTO: 1 % (ref 0–6)
ERYTHROCYTE [DISTWIDTH] IN BLOOD BY AUTOMATED COUNT: 11.7 % (ref 11.6–15.1)
GFR SERPL CREATININE-BSD FRML MDRD: 76 ML/MIN/1.73SQ M
GLUCOSE SERPL-MCNC: 134 MG/DL (ref 65–140)
HCT VFR BLD AUTO: 40 % (ref 34.8–46.1)
HGB BLD-MCNC: 13.5 G/DL (ref 11.5–15.4)
IMM GRANULOCYTES # BLD AUTO: 0.03 THOUSAND/UL (ref 0–0.2)
IMM GRANULOCYTES NFR BLD AUTO: 1 % (ref 0–2)
LYMPHOCYTES # BLD AUTO: 1.07 THOUSANDS/ΜL (ref 0.6–4.47)
LYMPHOCYTES NFR BLD AUTO: 17 % (ref 14–44)
MCH RBC QN AUTO: 31.3 PG (ref 26.8–34.3)
MCHC RBC AUTO-ENTMCNC: 33.8 G/DL (ref 31.4–37.4)
MCV RBC AUTO: 93 FL (ref 82–98)
MONOCYTES # BLD AUTO: 0.48 THOUSAND/ΜL (ref 0.17–1.22)
MONOCYTES NFR BLD AUTO: 8 % (ref 4–12)
NEUTROPHILS # BLD AUTO: 4.65 THOUSANDS/ΜL (ref 1.85–7.62)
NEUTS SEG NFR BLD AUTO: 72 % (ref 43–75)
NRBC BLD AUTO-RTO: 0 /100 WBCS
PLATELET # BLD AUTO: 189 THOUSANDS/UL (ref 149–390)
PMV BLD AUTO: 10.8 FL (ref 8.9–12.7)
POTASSIUM SERPL-SCNC: 4 MMOL/L (ref 3.5–5.3)
PROT SERPL-MCNC: 6.4 G/DL (ref 6.4–8.2)
RBC # BLD AUTO: 4.31 MILLION/UL (ref 3.81–5.12)
SODIUM SERPL-SCNC: 144 MMOL/L (ref 136–145)
WBC # BLD AUTO: 6.32 THOUSAND/UL (ref 4.31–10.16)

## 2018-10-13 PROCEDURE — 80053 COMPREHEN METABOLIC PANEL: CPT

## 2018-10-13 PROCEDURE — 85025 COMPLETE CBC W/AUTO DIFF WBC: CPT

## 2018-10-13 PROCEDURE — 36415 COLL VENOUS BLD VENIPUNCTURE: CPT

## 2018-10-15 LAB
ATRIAL RATE: 65 BPM
P AXIS: 53 DEGREES
PR INTERVAL: 160 MS
QRS AXIS: 35 DEGREES
QRSD INTERVAL: 72 MS
QT INTERVAL: 384 MS
QTC INTERVAL: 399 MS
T WAVE AXIS: 53 DEGREES
VENTRICULAR RATE: 65 BPM

## 2018-10-15 PROCEDURE — 93010 ELECTROCARDIOGRAM REPORT: CPT | Performed by: INTERNAL MEDICINE

## 2018-10-15 RX ORDER — SODIUM CHLORIDE, SODIUM LACTATE, POTASSIUM CHLORIDE, CALCIUM CHLORIDE 600; 310; 30; 20 MG/100ML; MG/100ML; MG/100ML; MG/100ML
125 INJECTION, SOLUTION INTRAVENOUS CONTINUOUS
Status: CANCELLED | OUTPATIENT
Start: 2018-10-15

## 2018-10-15 RX ORDER — CHLORHEXIDINE GLUCONATE 4 G/100ML
SOLUTION TOPICAL DAILY PRN
Status: CANCELLED | OUTPATIENT
Start: 2018-10-15

## 2018-10-15 NOTE — PROGRESS NOTES
Assessment/Plan:    Umbilical hernia without obstruction and without gangrene  Symptomatic umbilical hernia  Patient wished to have this repaired  I think this is reasonable  The surgery itself including all associated risks and benefits were discussed with the patient great length  The patient verbalized understands risks is willing to proceed, consent was signed  She will require preoperative labs and EKG  Diagnoses and all orders for this visit:    Umbilical hernia without obstruction and without gangrene          Subjective:      Patient ID: Cesar Pinot is a 70 y o  female  15-year-old female who presents today for follow-up evaluation of umbilical hernia  Patient states she still having pain at her umbilicus especially with heavy lifting and bending  This is essentially problem at work  We have talked in the past but getting this repaired and she is now agreeable  She states she want to wait due to the fact she had some recent travel plans  Currently denies any nausea vomiting  No fevers or chills  No changes in bowel bladder habits  No chest pain shortness of breath  She does occasionally get have some periumbilical abdominal pain nonradiating  The following portions of the patient's history were reviewed and updated as appropriate:   She  has a past medical history of Hyperglycemia and Medullary sponge kidney  She   Patient Active Problem List    Diagnosis Date Noted    Umbilical hernia without obstruction and without gangrene 2018    Obstructive sleep apnea 2018    Sleep disturbance 2018    Insufficient sleep syndrome 2018    Anxiety 2018    Hypertension 2018    Palpitations 2018    Dry mouth 2018    Nephrolithiasis 10/12/2015    Esophageal reflux 2012    Osteoporosis 2012     She  has a past surgical history that includes Cataract extraction (Left, 2011);   section; Cystoscopy w/ ureteral stent placement (Right); Cystoscopy w/ ureteral stent removal (Right, 10/27/2015); Dilation and curettage of uterus; Oophorectomy (Left); and Total abdominal hysterectomy  Her family history includes Coronary artery disease in her mother; Diabetes in her family; Hypertension in her mother; Stroke in her mother  She  reports that she has never smoked  She has never used smokeless tobacco  She reports that she does not drink alcohol or use drugs  Current Outpatient Prescriptions   Medication Sig Dispense Refill    atenolol (TENORMIN) 50 mg tablet Take 1 tablet (50 mg total) by mouth daily 90 tablet 3    buPROPion (WELLBUTRIN) 75 mg tablet Take 1 tablet (75 mg total) by mouth daily 90 tablet 3    Cholecalciferol (VITAMIN D3) 2000 units capsule Take by mouth      lisinopril (ZESTRIL) 10 mg tablet Take 1 tablet (10 mg total) by mouth daily 90 tablet 3    ALPRAZolam (XANAX) 0 5 mg tablet Take 1 tablet (0 5 mg total) by mouth daily for 90 days 90 tablet 0     No current facility-administered medications for this visit  She has No Known Allergies       Review of Systems   Constitutional: Negative for activity change, appetite change, chills, diaphoresis and fatigue  Respiratory: Negative for cough and shortness of breath  Cardiovascular: Negative for chest pain and palpitations  Gastrointestinal: Positive for abdominal pain  Negative for abdominal distention, constipation, diarrhea, nausea and vomiting  Musculoskeletal: Negative for back pain, joint swelling and neck pain  Skin: Negative for color change, pallor, rash and wound  Objective:      /81   Pulse 74   Temp 98 5 °F (36 9 °C)   Ht 5' (1 524 m)   Wt 57 6 kg (127 lb)   BMI 24 80 kg/m²          Physical Exam   Constitutional: She is oriented to person, place, and time  She appears well-developed and well-nourished  No distress  HENT:   Head: Normocephalic and atraumatic  Eyes: No scleral icterus     Neck: Normal range of motion  Neck supple  No tracheal deviation present  Cardiovascular: Normal rate, regular rhythm and normal heart sounds  Exam reveals no gallop and no friction rub  No murmur heard  Pulmonary/Chest: Effort normal and breath sounds normal  No respiratory distress  She has no wheezes  She has no rales  She exhibits no tenderness  Abdominal: Soft  She exhibits no distension and no mass  There is tenderness (Mild tenderness with palpation at the umbilicus  Small palpable hernia)  There is no rebound and no guarding  Musculoskeletal: Normal range of motion  She exhibits no edema, tenderness or deformity  Lymphadenopathy:     She has no cervical adenopathy  Neurological: She is alert and oriented to person, place, and time  No cranial nerve deficit  Skin: Skin is warm  No rash noted  She is not diaphoretic  No erythema  No pallor  Psychiatric: She has a normal mood and affect  Her behavior is normal    Vitals reviewed

## 2018-10-15 NOTE — H&P
Hernia consult     Assessment/Plan:    Umbilical hernia without obstruction and without gangrene  Symptomatic umbilical hernia  Patient wished to have this repaired  I think this is reasonable  The surgery itself including all associated risks and benefits were discussed with the patient great length  The patient verbalized understands risks is willing to proceed, consent was signed  She will require preoperative labs and EKG  Diagnoses and all orders for this visit:    Umbilical hernia without obstruction and without gangrene          Subjective:      Patient ID: Wilfredo Robison is a 70 y o  female  78-year-old female who presents today for follow-up evaluation of umbilical hernia  Patient states she still having pain at her umbilicus especially with heavy lifting and bending  This is essentially problem at work  We have talked in the past but getting this repaired and she is now agreeable  She states she want to wait due to the fact she had some recent travel plans  Currently denies any nausea vomiting  No fevers or chills  No changes in bowel bladder habits  No chest pain shortness of breath  She does occasionally get have some periumbilical abdominal pain nonradiating  The following portions of the patient's history were reviewed and updated as appropriate:   She  has a past medical history of Hyperglycemia and Medullary sponge kidney  She   Patient Active Problem List    Diagnosis Date Noted    Umbilical hernia without obstruction and without gangrene 2018    Obstructive sleep apnea 2018    Sleep disturbance 2018    Insufficient sleep syndrome 2018    Anxiety 2018    Hypertension 2018    Palpitations 2018    Dry mouth 2018    Nephrolithiasis 10/12/2015    Esophageal reflux 2012    Osteoporosis 2012     She  has a past surgical history that includes Cataract extraction (Left, 2011);   section; Cystoscopy w/ ureteral stent placement (Right); Cystoscopy w/ ureteral stent removal (Right, 10/27/2015); Dilation and curettage of uterus; Oophorectomy (Left); and Total abdominal hysterectomy  Her family history includes Coronary artery disease in her mother; Diabetes in her family; Hypertension in her mother; Stroke in her mother  She  reports that she has never smoked  She has never used smokeless tobacco  She reports that she does not drink alcohol or use drugs  Current Outpatient Prescriptions   Medication Sig Dispense Refill    atenolol (TENORMIN) 50 mg tablet Take 1 tablet (50 mg total) by mouth daily 90 tablet 3    buPROPion (WELLBUTRIN) 75 mg tablet Take 1 tablet (75 mg total) by mouth daily 90 tablet 3    Cholecalciferol (VITAMIN D3) 2000 units capsule Take by mouth      lisinopril (ZESTRIL) 10 mg tablet Take 1 tablet (10 mg total) by mouth daily 90 tablet 3    ALPRAZolam (XANAX) 0 5 mg tablet Take 1 tablet (0 5 mg total) by mouth daily for 90 days 90 tablet 0     No current facility-administered medications for this visit  She has No Known Allergies       Review of Systems   Constitutional: Negative for activity change, appetite change, chills, diaphoresis and fatigue  Respiratory: Negative for cough and shortness of breath  Cardiovascular: Negative for chest pain and palpitations  Gastrointestinal: Positive for abdominal pain  Negative for abdominal distention, constipation, diarrhea, nausea and vomiting  Musculoskeletal: Negative for back pain, joint swelling and neck pain  Skin: Negative for color change, pallor, rash and wound  Objective:      /81   Pulse 74   Temp 98 5 °F (36 9 °C)   Ht 5' (1 524 m)   Wt 57 6 kg (127 lb)   BMI 24 80 kg/m²           Physical Exam   Constitutional: She is oriented to person, place, and time  She appears well-developed and well-nourished  No distress  HENT:   Head: Normocephalic and atraumatic  Eyes: No scleral icterus     Neck: Normal range of motion  Neck supple  No tracheal deviation present  Cardiovascular: Normal rate, regular rhythm and normal heart sounds  Exam reveals no gallop and no friction rub  No murmur heard  Pulmonary/Chest: Effort normal and breath sounds normal  No respiratory distress  She has no wheezes  She has no rales  She exhibits no tenderness  Abdominal: Soft  She exhibits no distension and no mass  There is tenderness (Mild tenderness with palpation at the umbilicus  Small palpable hernia)  There is no rebound and no guarding  Musculoskeletal: Normal range of motion  She exhibits no edema, tenderness or deformity  Lymphadenopathy:     She has no cervical adenopathy  Neurological: She is alert and oriented to person, place, and time  No cranial nerve deficit  Skin: Skin is warm  No rash noted  She is not diaphoretic  No erythema  No pallor  Psychiatric: She has a normal mood and affect  Her behavior is normal    Vitals reviewed

## 2018-10-15 NOTE — ASSESSMENT & PLAN NOTE
Symptomatic umbilical hernia  Patient wished to have this repaired  I think this is reasonable  The surgery itself including all associated risks and benefits were discussed with the patient great length  The patient verbalized understands risks is willing to proceed, consent was signed  She will require preoperative labs and EKG

## 2018-10-22 ENCOUNTER — ANESTHESIA EVENT (OUTPATIENT)
Dept: PERIOP | Facility: HOSPITAL | Age: 63
End: 2018-10-22
Payer: COMMERCIAL

## 2018-10-22 RX ORDER — SODIUM CHLORIDE, SODIUM LACTATE, POTASSIUM CHLORIDE, CALCIUM CHLORIDE 600; 310; 30; 20 MG/100ML; MG/100ML; MG/100ML; MG/100ML
125 INJECTION, SOLUTION INTRAVENOUS CONTINUOUS
Status: CANCELLED | OUTPATIENT
Start: 2018-10-22

## 2018-10-22 NOTE — ANESTHESIA PREPROCEDURE EVALUATION
Review of Systems/Medical History  Patient summary reviewed  Chart reviewed  History of anesthetic complications PONV    Cardiovascular  EKG reviewed, Exercise tolerance (METS): >4,  Hypertension controlled,    Pulmonary  Sleep apnea CPAP,        GI/Hepatic    GERD ,        Kidney stones (history),        Endo/Other  Negative endo/other ROS      GYN  Negative gynecology ROS          Hematology  Negative hematology ROS      Musculoskeletal  Negative musculoskeletal ROS        Neurology  Negative neurology ROS      Psychology   Anxiety,              Physical Exam    Airway    Mallampati score: I  TM Distance: >3 FB  Neck ROM: full     Dental   Comment: Upper edentulous lower no reported loose, upper dentures,     Cardiovascular  Cardiovascular exam normal    Pulmonary  Pulmonary exam normal     Other Findings        Anesthesia Plan  ASA Score- 2     Anesthesia Type- general with ASA Monitors  Additional Monitors:   Airway Plan: ETT and LMA  Plan Factors-    Induction- intravenous  Postoperative Plan- Plan for postoperative opioid use  Informed Consent- Anesthetic plan and risks discussed with patient  I personally reviewed this patient with the CRNA  Discussed and agreed on the Anesthesia Plan with the CRNA  Otoniel Montero

## 2018-10-23 ENCOUNTER — ANESTHESIA (OUTPATIENT)
Dept: PERIOP | Facility: HOSPITAL | Age: 63
End: 2018-10-23
Payer: COMMERCIAL

## 2018-10-23 ENCOUNTER — HOSPITAL ENCOUNTER (OUTPATIENT)
Facility: HOSPITAL | Age: 63
Setting detail: OUTPATIENT SURGERY
Discharge: HOME | End: 2018-10-23
Attending: SURGERY | Admitting: SURGERY
Payer: COMMERCIAL

## 2018-10-23 VITALS
BODY MASS INDEX: 24.94 KG/M2 | TEMPERATURE: 97.5 F | RESPIRATION RATE: 18 BRPM | HEIGHT: 60 IN | HEART RATE: 56 BPM | WEIGHT: 127 LBS | DIASTOLIC BLOOD PRESSURE: 56 MMHG | SYSTOLIC BLOOD PRESSURE: 122 MMHG | OXYGEN SATURATION: 95 %

## 2018-10-23 DIAGNOSIS — K42.9 UMBILICAL HERNIA WITHOUT OBSTRUCTION AND WITHOUT GANGRENE: Primary | ICD-10-CM

## 2018-10-23 PROCEDURE — 49585 PR REPAIR UMBILICAL HERN,5+Y/O,REDUC: CPT

## 2018-10-23 PROCEDURE — 49585 PR REPAIR UMBILICAL HERN,5+Y/O,REDUC: CPT | Performed by: SURGERY

## 2018-10-23 RX ORDER — PROPOFOL 10 MG/ML
INJECTION, EMULSION INTRAVENOUS AS NEEDED
Status: DISCONTINUED | OUTPATIENT
Start: 2018-10-23 | End: 2018-10-23 | Stop reason: SURG

## 2018-10-23 RX ORDER — ONDANSETRON 2 MG/ML
INJECTION INTRAMUSCULAR; INTRAVENOUS AS NEEDED
Status: DISCONTINUED | OUTPATIENT
Start: 2018-10-23 | End: 2018-10-23 | Stop reason: SURG

## 2018-10-23 RX ORDER — EPHEDRINE SULFATE 50 MG/ML
INJECTION, SOLUTION INTRAVENOUS AS NEEDED
Status: DISCONTINUED | OUTPATIENT
Start: 2018-10-23 | End: 2018-10-23 | Stop reason: SURG

## 2018-10-23 RX ORDER — SODIUM CHLORIDE, SODIUM LACTATE, POTASSIUM CHLORIDE, CALCIUM CHLORIDE 600; 310; 30; 20 MG/100ML; MG/100ML; MG/100ML; MG/100ML
125 INJECTION, SOLUTION INTRAVENOUS CONTINUOUS
Status: DISCONTINUED | OUTPATIENT
Start: 2018-10-23 | End: 2018-10-23

## 2018-10-23 RX ORDER — KETOROLAC TROMETHAMINE 30 MG/ML
INJECTION, SOLUTION INTRAMUSCULAR; INTRAVENOUS AS NEEDED
Status: DISCONTINUED | OUTPATIENT
Start: 2018-10-23 | End: 2018-10-23 | Stop reason: SURG

## 2018-10-23 RX ORDER — METOCLOPRAMIDE HYDROCHLORIDE 5 MG/ML
INJECTION INTRAMUSCULAR; INTRAVENOUS AS NEEDED
Status: DISCONTINUED | OUTPATIENT
Start: 2018-10-23 | End: 2018-10-23 | Stop reason: SURG

## 2018-10-23 RX ORDER — ONDANSETRON 2 MG/ML
4 INJECTION INTRAMUSCULAR; INTRAVENOUS ONCE AS NEEDED
Status: DISCONTINUED | OUTPATIENT
Start: 2018-10-23 | End: 2018-10-23 | Stop reason: HOSPADM

## 2018-10-23 RX ORDER — BUPIVACAINE HYDROCHLORIDE 5 MG/ML
INJECTION, SOLUTION PERINEURAL AS NEEDED
Status: DISCONTINUED | OUTPATIENT
Start: 2018-10-23 | End: 2018-10-23 | Stop reason: HOSPADM

## 2018-10-23 RX ORDER — FENTANYL CITRATE/PF 50 MCG/ML
25 SYRINGE (ML) INJECTION
Status: DISCONTINUED | OUTPATIENT
Start: 2018-10-23 | End: 2018-10-23 | Stop reason: HOSPADM

## 2018-10-23 RX ORDER — OXYCODONE HYDROCHLORIDE AND ACETAMINOPHEN 5; 325 MG/1; MG/1
2 TABLET ORAL EVERY 4 HOURS PRN
Status: DISCONTINUED | OUTPATIENT
Start: 2018-10-23 | End: 2018-10-23 | Stop reason: HOSPADM

## 2018-10-23 RX ORDER — SODIUM CHLORIDE, SODIUM LACTATE, POTASSIUM CHLORIDE, CALCIUM CHLORIDE 600; 310; 30; 20 MG/100ML; MG/100ML; MG/100ML; MG/100ML
100 INJECTION, SOLUTION INTRAVENOUS CONTINUOUS
Status: DISCONTINUED | OUTPATIENT
Start: 2018-10-23 | End: 2018-10-23 | Stop reason: HOSPADM

## 2018-10-23 RX ORDER — ASCORBIC ACID 500 MG
500 TABLET ORAL DAILY
COMMUNITY

## 2018-10-23 RX ORDER — OXYCODONE HYDROCHLORIDE AND ACETAMINOPHEN 5; 325 MG/1; MG/1
1 TABLET ORAL EVERY 4 HOURS PRN
Qty: 30 TABLET | Refills: 0 | Status: SHIPPED | OUTPATIENT
Start: 2018-10-23 | End: 2018-12-06

## 2018-10-23 RX ORDER — DEXAMETHASONE SODIUM PHOSPHATE 4 MG/ML
INJECTION, SOLUTION INTRA-ARTICULAR; INTRALESIONAL; INTRAMUSCULAR; INTRAVENOUS; SOFT TISSUE AS NEEDED
Status: DISCONTINUED | OUTPATIENT
Start: 2018-10-23 | End: 2018-10-23 | Stop reason: SURG

## 2018-10-23 RX ORDER — LIDOCAINE HYDROCHLORIDE 10 MG/ML
INJECTION, SOLUTION INFILTRATION; PERINEURAL AS NEEDED
Status: DISCONTINUED | OUTPATIENT
Start: 2018-10-23 | End: 2018-10-23 | Stop reason: SURG

## 2018-10-23 RX ORDER — SODIUM CHLORIDE, SODIUM LACTATE, POTASSIUM CHLORIDE, CALCIUM CHLORIDE 600; 310; 30; 20 MG/100ML; MG/100ML; MG/100ML; MG/100ML
125 INJECTION, SOLUTION INTRAVENOUS CONTINUOUS
Status: CANCELLED | OUTPATIENT
Start: 2018-10-23 | End: 2018-10-23

## 2018-10-23 RX ORDER — MIDAZOLAM HYDROCHLORIDE 1 MG/ML
INJECTION INTRAMUSCULAR; INTRAVENOUS AS NEEDED
Status: DISCONTINUED | OUTPATIENT
Start: 2018-10-23 | End: 2018-10-23 | Stop reason: SURG

## 2018-10-23 RX ORDER — FENTANYL CITRATE 50 UG/ML
INJECTION, SOLUTION INTRAMUSCULAR; INTRAVENOUS AS NEEDED
Status: DISCONTINUED | OUTPATIENT
Start: 2018-10-23 | End: 2018-10-23 | Stop reason: SURG

## 2018-10-23 RX ORDER — ROCURONIUM BROMIDE 10 MG/ML
INJECTION, SOLUTION INTRAVENOUS AS NEEDED
Status: DISCONTINUED | OUTPATIENT
Start: 2018-10-23 | End: 2018-10-23 | Stop reason: SURG

## 2018-10-23 RX ORDER — MORPHINE SULFATE 4 MG/ML
2 INJECTION, SOLUTION INTRAMUSCULAR; INTRAVENOUS EVERY 4 HOURS PRN
Status: DISCONTINUED | OUTPATIENT
Start: 2018-10-23 | End: 2018-10-23 | Stop reason: HOSPADM

## 2018-10-23 RX ORDER — CHLORHEXIDINE GLUCONATE 4 G/100ML
SOLUTION TOPICAL DAILY PRN
Status: DISCONTINUED | OUTPATIENT
Start: 2018-10-23 | End: 2018-10-23 | Stop reason: HOSPADM

## 2018-10-23 RX ORDER — ONDANSETRON 2 MG/ML
4 INJECTION INTRAMUSCULAR; INTRAVENOUS EVERY 8 HOURS PRN
Status: DISCONTINUED | OUTPATIENT
Start: 2018-10-23 | End: 2018-10-23 | Stop reason: HOSPADM

## 2018-10-23 RX ORDER — SUCCINYLCHOLINE CHLORIDE 20 MG/ML
INJECTION INTRAMUSCULAR; INTRAVENOUS AS NEEDED
Status: DISCONTINUED | OUTPATIENT
Start: 2018-10-23 | End: 2018-10-23 | Stop reason: SURG

## 2018-10-23 RX ADMIN — ONDANSETRON HYDROCHLORIDE 4 MG: 2 INJECTION, SOLUTION INTRAVENOUS at 11:50

## 2018-10-23 RX ADMIN — LIDOCAINE HYDROCHLORIDE 100 MG: 10 INJECTION, SOLUTION INFILTRATION; PERINEURAL at 11:15

## 2018-10-23 RX ADMIN — EPHEDRINE SULFATE 5 MG: 50 INJECTION, SOLUTION INTRAMUSCULAR; INTRAVENOUS; SUBCUTANEOUS at 11:48

## 2018-10-23 RX ADMIN — CEFAZOLIN SODIUM 1000 MG: 1 SOLUTION INTRAVENOUS at 11:10

## 2018-10-23 RX ADMIN — METOCLOPRAMIDE HYDROCHLORIDE 10 MG: 5 INJECTION INTRAMUSCULAR; INTRAVENOUS at 11:10

## 2018-10-23 RX ADMIN — SUCCINYLCHOLINE CHLORIDE 80 MG: 20 INJECTION, SOLUTION INTRAMUSCULAR; INTRAVENOUS at 11:17

## 2018-10-23 RX ADMIN — MIDAZOLAM HYDROCHLORIDE 2 MG: 1 INJECTION, SOLUTION INTRAMUSCULAR; INTRAVENOUS at 11:10

## 2018-10-23 RX ADMIN — ROCURONIUM BROMIDE 5 MG: 10 INJECTION INTRAVENOUS at 11:14

## 2018-10-23 RX ADMIN — PROPOFOL 130 MG: 10 INJECTION, EMULSION INTRAVENOUS at 11:15

## 2018-10-23 RX ADMIN — KETOROLAC TROMETHAMINE 15 MG: 30 INJECTION, SOLUTION INTRAMUSCULAR at 11:50

## 2018-10-23 RX ADMIN — DEXAMETHASONE SODIUM PHOSPHATE 4 MG: 4 INJECTION, SOLUTION INTRAMUSCULAR; INTRAVENOUS at 11:26

## 2018-10-23 RX ADMIN — SODIUM CHLORIDE, SODIUM LACTATE, POTASSIUM CHLORIDE, AND CALCIUM CHLORIDE 125 ML/HR: .6; .31; .03; .02 INJECTION, SOLUTION INTRAVENOUS at 10:45

## 2018-10-23 RX ADMIN — FENTANYL CITRATE 50 MCG: 50 INJECTION, SOLUTION INTRAMUSCULAR; INTRAVENOUS at 11:14

## 2018-10-23 NOTE — OP NOTE
OPERATIVE REPORT  PATIENT NAME: Nena Jenkins    :  1955  MRN: [id number]  Pt Location: [facility] OR ROOM 02    SURGERY DATE: 10/23/2018    Surgeon(s) and Role: Meredith Gonzáles, DO - Primary     * Kylee Thomas PA-C    Preop Diagnosis:  Umbilical hernia without obstruction and without gangrene [K42 9]    Post-Op Diagnosis Codes:     * Umbilical hernia without obstruction and without gangrene [K42 9]    Procedure(s) (LRB):  REPAIR HERNIA UMBILICAL (N/A)    Specimen(s):  * No specimens in log *    Estimated Blood Loss:   Minimal    Drains:       Anesthesia Type:   General    Operative Indications:  Umbilical hernia without obstruction and without gangrene [K42 9]      Operative Findings:  Umbilical hernia measuring 1 cm containing fat  Complications:   None    Procedure and Technique:  Patient brought to the operating room and placed in supine position operating table  All regular monitoring devices were connected  The patient underwent general anesthesia with endotracheal patient without complication  The patient received perioperative antibiotics  and bilateral lower sequential compression devices for DVT prophylaxis  Patient was then prepped and draped in usual sterile fashion  Timeout was performed to verify correct patient, procedure, position, and site  A infrraumbilical curvilinearincision was made using a 15 scalpel overlying palpable mass    The incision was carried down through the dermis and subcutaneous fat using cautery  Hemostasis was achieved  The tissues were further dissected down to the fascial layer  The umbilical stalk was then encircled and dissected and then detached from the underlying fascia  Once this occurred a defect in the fascia consistent with an umbilical hernia was found, which contained preperitoneal fat  The fat was then dissected out and freed from any adhesions of the surrounding fascia   The fascial edges were then freshened up and any additional attending and was excised  The fascial defect at that point was measured and noted to be 1cm  The underside of the fascia was then palpated and inspected and there appeared to be no other subsequent defects  The hernia was then repaired primarily with interrupted #1 Prolene sutures placed in a figure-of-eight fashion  The wound was irrigated once again hemostasis again was achieved  The umbilical stalk was then reapproximated using 2 Vicryl  The dermal tissue was then reapproximated using interrupted 3-0 Vicryl  The skin was then approximated using 4-0 Monocryl  Steri-Strips and dry sterile dressing then applied  The patient tolerated procedure well and was transported back to postanesthesia care unit in stable condition  All lap, needle, and instrument counts were correct  I was present for the entire procedure and A qualified resident physician was not available, IWONA Gutiérrez was required for adequate exposure retraction of the case      Patient Disposition:  PACU     SIGNATURE: Nova Byers DO  DATE: October 23, 2018  TIME: 11:51 AM

## 2018-10-23 NOTE — INTERIM OP NOTE
REPAIR HERNIA UMBILICAL  Postoperative Note  PATIENT NAME: Danelle Gracia  : 1955  MRN: [id number]  [facility] RO[facility] 02    Surgery Date: 10/23/2018    Preop Diagnosis:  Umbilical hernia without obstruction and without gangrene [K42 9]    Post-Op Diagnosis Codes:     * Umbilical hernia without obstruction and without gangrene [K42 9]    Procedure(s) (LRB):  REPAIR HERNIA UMBILICAL (N/A)    Surgeon(s) and Role: Karrie Terry DO - Primary     * Cynthia Lennon PA-C    Specimens:  * No specimens in log *    Estimated Blood Loss:   Minimal    Anesthesia Type:   General     Findings:    Umbilical hernia measuring approximately 1 cm containing fat    Complications:   None    SIGNATURE: Hipolito Angel DO   DATE: 2018   TIME: 11:51 AM

## 2018-10-23 NOTE — DISCHARGE INSTRUCTIONS
Follow-up Dr Lopez Bautista in 2 weeks as per appointment  Regular diet as tolerated  Low intensity activity as tolerated, no heavy lifting, nothing greater than 14 lb for 6 weeks  Dressing may be removed on Thursday  You may shower on Thursday  No baths or swimming  If developed fever, worsening pain, redness or drainage from incision call the office or go to the ER

## 2018-10-23 NOTE — H&P (VIEW-ONLY)
Hernia consult     Assessment/Plan:    Umbilical hernia without obstruction and without gangrene  Symptomatic umbilical hernia  Patient wished to have this repaired  I think this is reasonable  The surgery itself including all associated risks and benefits were discussed with the patient great length  The patient verbalized understands risks is willing to proceed, consent was signed  She will require preoperative labs and EKG  Diagnoses and all orders for this visit:    Umbilical hernia without obstruction and without gangrene          Subjective:      Patient ID: Verner Boll is a 70 y o  female  53-year-old female who presents today for follow-up evaluation of umbilical hernia  Patient states she still having pain at her umbilicus especially with heavy lifting and bending  This is essentially problem at work  We have talked in the past but getting this repaired and she is now agreeable  She states she want to wait due to the fact she had some recent travel plans  Currently denies any nausea vomiting  No fevers or chills  No changes in bowel bladder habits  No chest pain shortness of breath  She does occasionally get have some periumbilical abdominal pain nonradiating  The following portions of the patient's history were reviewed and updated as appropriate:   She  has a past medical history of Hyperglycemia and Medullary sponge kidney  She   Patient Active Problem List    Diagnosis Date Noted    Umbilical hernia without obstruction and without gangrene 2018    Obstructive sleep apnea 2018    Sleep disturbance 2018    Insufficient sleep syndrome 2018    Anxiety 2018    Hypertension 2018    Palpitations 2018    Dry mouth 2018    Nephrolithiasis 10/12/2015    Esophageal reflux 2012    Osteoporosis 2012     She  has a past surgical history that includes Cataract extraction (Left, 2011);   section; Cystoscopy w/ ureteral stent placement (Right); Cystoscopy w/ ureteral stent removal (Right, 10/27/2015); Dilation and curettage of uterus; Oophorectomy (Left); and Total abdominal hysterectomy  Her family history includes Coronary artery disease in her mother; Diabetes in her family; Hypertension in her mother; Stroke in her mother  She  reports that she has never smoked  She has never used smokeless tobacco  She reports that she does not drink alcohol or use drugs  Current Outpatient Prescriptions   Medication Sig Dispense Refill    atenolol (TENORMIN) 50 mg tablet Take 1 tablet (50 mg total) by mouth daily 90 tablet 3    buPROPion (WELLBUTRIN) 75 mg tablet Take 1 tablet (75 mg total) by mouth daily 90 tablet 3    Cholecalciferol (VITAMIN D3) 2000 units capsule Take by mouth      lisinopril (ZESTRIL) 10 mg tablet Take 1 tablet (10 mg total) by mouth daily 90 tablet 3    ALPRAZolam (XANAX) 0 5 mg tablet Take 1 tablet (0 5 mg total) by mouth daily for 90 days 90 tablet 0     No current facility-administered medications for this visit  She has No Known Allergies       Review of Systems   Constitutional: Negative for activity change, appetite change, chills, diaphoresis and fatigue  Respiratory: Negative for cough and shortness of breath  Cardiovascular: Negative for chest pain and palpitations  Gastrointestinal: Positive for abdominal pain  Negative for abdominal distention, constipation, diarrhea, nausea and vomiting  Musculoskeletal: Negative for back pain, joint swelling and neck pain  Skin: Negative for color change, pallor, rash and wound  Objective:      /81   Pulse 74   Temp 98 5 °F (36 9 °C)   Ht 5' (1 524 m)   Wt 57 6 kg (127 lb)   BMI 24 80 kg/m²           Physical Exam   Constitutional: She is oriented to person, place, and time  She appears well-developed and well-nourished  No distress  HENT:   Head: Normocephalic and atraumatic  Eyes: No scleral icterus     Neck: Normal range of motion  Neck supple  No tracheal deviation present  Cardiovascular: Normal rate, regular rhythm and normal heart sounds  Exam reveals no gallop and no friction rub  No murmur heard  Pulmonary/Chest: Effort normal and breath sounds normal  No respiratory distress  She has no wheezes  She has no rales  She exhibits no tenderness  Abdominal: Soft  She exhibits no distension and no mass  There is tenderness (Mild tenderness with palpation at the umbilicus  Small palpable hernia)  There is no rebound and no guarding  Musculoskeletal: Normal range of motion  She exhibits no edema, tenderness or deformity  Lymphadenopathy:     She has no cervical adenopathy  Neurological: She is alert and oriented to person, place, and time  No cranial nerve deficit  Skin: Skin is warm  No rash noted  She is not diaphoretic  No erythema  No pallor  Psychiatric: She has a normal mood and affect  Her behavior is normal    Vitals reviewed

## 2018-11-08 ENCOUNTER — OFFICE VISIT (OUTPATIENT)
Dept: SURGERY | Facility: HOSPITAL | Age: 63
End: 2018-11-08

## 2018-11-08 VITALS
TEMPERATURE: 98.1 F | HEIGHT: 60 IN | HEART RATE: 67 BPM | BODY MASS INDEX: 25.13 KG/M2 | DIASTOLIC BLOOD PRESSURE: 65 MMHG | SYSTOLIC BLOOD PRESSURE: 128 MMHG | WEIGHT: 128 LBS

## 2018-11-08 DIAGNOSIS — K42.9 UMBILICAL HERNIA WITHOUT OBSTRUCTION AND WITHOUT GANGRENE: Primary | ICD-10-CM

## 2018-11-08 PROCEDURE — 99024 POSTOP FOLLOW-UP VISIT: CPT | Performed by: SURGERY

## 2018-11-08 NOTE — PROGRESS NOTES
Assessment/Plan:    Umbilical hernia without obstruction and without gangrene   Patient presents for postop check after undergoing umbilical hernia pair  Doing well  Incision healing well  No evidence recurrence  Continue with lifting restrictions for 1 month  Follow-up 4 weeks  Diagnoses and all orders for this visit:    Umbilical hernia without obstruction and without gangrene          Subjective:      Patient ID: Herber Narvaez is a 70 y o  female  64-year-old female status post umbilical hernia repair primarily, presents today for a postop check  Overall doing very well  No nausea vomiting  No fevers or chills  Does have some periumbilical ends discomfort  Tolerating diet  No changes in bowel bladder habits  No redness or drainage from the incision  The following portions of the patient's history were reviewed and updated as appropriate:   She  has a past medical history of CPAP (continuous positive airway pressure) dependence; DJD (degenerative joint disease) of cervical spine; Hyperglycemia; Hypertension; Medullary sponge kidney; and Sleep apnea  She   Patient Active Problem List    Diagnosis Date Noted    Obstructive sleep apnea 2018    Sleep disturbance 2018    Insufficient sleep syndrome 2018    Anxiety 2018    Hypertension 2018    Palpitations 2018    Dry mouth 2018    Nephrolithiasis 10/12/2015    Esophageal reflux 2012    Osteoporosis 2012     She  has a past surgical history that includes Cataract extraction (Left, 2011);  section; Cystoscopy w/ ureteral stent placement (Right); Cystoscopy w/ ureteral stent removal (Right, 10/27/2015); Dilation and curettage of uterus; Oophorectomy (Left); Total abdominal hysterectomy; and pr repair umbilical [id number],5+P/X,[id number] (N/A, 10/23/2018)  Her family history includes Coronary artery disease in her mother; Diabetes in her family;  Hypertension in her mother; Stroke in her mother  She  reports that she has never smoked  She has never used smokeless tobacco  She reports that she drinks alcohol  She reports that she does not use drugs  Current Outpatient Prescriptions   Medication Sig Dispense Refill    ascorbic acid (VITAMIN C) 500 mg tablet Take 500 mg by mouth daily      atenolol (TENORMIN) 50 mg tablet Take 1 tablet (50 mg total) by mouth daily 90 tablet 3    buPROPion (WELLBUTRIN) 75 mg tablet Take 1 tablet (75 mg total) by mouth daily 90 tablet 3    Cholecalciferol (VITAMIN D3) 2000 units capsule Take by mouth      lisinopril (ZESTRIL) 10 mg tablet Take 1 tablet (10 mg total) by mouth daily 90 tablet 3    ALPRAZolam (XANAX) 0 5 mg tablet Take 1 tablet (0 5 mg total) by mouth daily for 90 days 90 tablet 0    oxyCODONE-acetaminophen (PERCOCET) 5-325 mg per tablet Take 1 tablet by mouth every 4 (four) hours as needed for moderate pain or severe pain Max Daily Amount: 6 tablets (Patient not taking: Reported on 11/8/2018 ) 30 tablet 0     No current facility-administered medications for this visit  She has No Known Allergies       Review of Systems   Constitutional: Negative  Gastrointestinal: Negative  Skin: Negative  Objective:      /65   Pulse 67   Temp 98 1 °F (36 7 °C)   Ht 5' (1 524 m)   Wt 58 1 kg (128 lb)   BMI 25 00 kg/m²          Physical Exam   Constitutional: She appears well-developed and well-nourished  No distress  Abdominal: Soft  She exhibits no distension and no mass  There is tenderness (At the incision)  There is no rebound and no guarding  Infraumbilical incision healing well good cosmesis  No evidence of any erythema or active drainage  Mild firmness underneath the incision consistent with postoperative changes  Skin: Skin is warm  No rash noted  She is not diaphoretic  No erythema  No pallor  Vitals reviewed

## 2018-11-08 NOTE — ASSESSMENT & PLAN NOTE
Patient presents for postop check after undergoing umbilical hernia pair  Doing well  Incision healing well  No evidence recurrence  Continue with lifting restrictions for 1 month  Follow-up 4 weeks

## 2018-12-06 ENCOUNTER — OFFICE VISIT (OUTPATIENT)
Dept: SURGERY | Facility: HOSPITAL | Age: 63
End: 2018-12-06

## 2018-12-06 VITALS
SYSTOLIC BLOOD PRESSURE: 130 MMHG | BODY MASS INDEX: 25.72 KG/M2 | TEMPERATURE: 98.1 F | HEART RATE: 64 BPM | WEIGHT: 131 LBS | DIASTOLIC BLOOD PRESSURE: 66 MMHG | HEIGHT: 60 IN

## 2018-12-06 DIAGNOSIS — K42.9 UMBILICAL HERNIA WITHOUT OBSTRUCTION AND WITHOUT GANGRENE: Primary | ICD-10-CM

## 2018-12-06 PROCEDURE — 99024 POSTOP FOLLOW-UP VISIT: CPT | Performed by: SURGERY

## 2018-12-06 NOTE — LETTER
December 6, 2018     Patient: Aly Amato   YOB: 1955   Date of Visit: 12/6/2018       To Whom it May Concern:    Aly Amtao is under my professional care  She was seen in my office on 12/6/2018  She can return to work on 12/23/2018 with no restrictions  If you have any questions or concerns, please don't hesitate to call           Sincerely,          Terri Price,         CC: No Recipients

## 2018-12-06 NOTE — PROGRESS NOTES
Assessment/Plan:    Umbilical hernia without obstruction and without gangrene  Status post umbilical hernia pair primarily  Doing well  Six week follow-up without any complaints  Nontender on exam   No evidence recurrence  Patient may return to work without lifting restrictions  Diagnoses and all orders for this visit:    Umbilical hernia without obstruction and without gangrene          Subjective:      Patient ID: Maribel Barnes is a 70 y o  female  52-year-old female status post repair of umbilical hernia primarily, presents today for follow-up  Doing well  No nausea vomiting  No fevers or chills  The most part no abdominal pain  No bulge noted  She did have a small pain when bending over yesterday however that resolved and has not come back  Patient is ready to go back to work on her scheduled date as determined by [employer]  The following portions of the patient's history were reviewed and updated as appropriate: She  has a past medical history of CPAP (continuous positive airway pressure) dependence; DJD (degenerative joint disease) of cervical spine; Hyperglycemia; Hypertension; Medullary sponge kidney; and Sleep apnea  She   Patient Active Problem List    Diagnosis Date Noted    Obstructive sleep apnea 2018    Sleep disturbance 2018    Insufficient sleep syndrome 2018    Anxiety 2018    Hypertension 2018    Palpitations 2018    Dry mouth 2018    Nephrolithiasis 10/12/2015    Esophageal reflux 2012    Osteoporosis 2012     She  has a past surgical history that includes Cataract extraction (Left, 2011);  section; Cystoscopy w/ ureteral stent placement (Right); Cystoscopy w/ ureteral stent removal (Right, 10/27/2015); Dilation and curettage of uterus; Oophorectomy (Left); Total abdominal hysterectomy; and pr repair umbilical BXJN,7+E/V,NPJGO (N/A, 10/23/2018)    Her family history includes Coronary artery disease in her mother; Diabetes in her family; Hypertension in her mother; Stroke in her mother  She  reports that she has never smoked  She has never used smokeless tobacco  She reports that she drinks alcohol  She reports that she does not use drugs  Current Outpatient Prescriptions   Medication Sig Dispense Refill    ascorbic acid (VITAMIN C) 500 mg tablet Take 500 mg by mouth daily      atenolol (TENORMIN) 50 mg tablet Take 1 tablet (50 mg total) by mouth daily 90 tablet 3    buPROPion (WELLBUTRIN) 75 mg tablet Take 1 tablet (75 mg total) by mouth daily 90 tablet 3    Cholecalciferol (VITAMIN D3) 2000 units capsule Take by mouth      lisinopril (ZESTRIL) 10 mg tablet Take 1 tablet (10 mg total) by mouth daily 90 tablet 3    ALPRAZolam (XANAX) 0 5 mg tablet Take 1 tablet (0 5 mg total) by mouth daily for 90 days 90 tablet 0     No current facility-administered medications for this visit  She has No Known Allergies       Review of Systems   Constitutional: Negative  Gastrointestinal: Negative  Skin: Negative  Objective:      /66   Pulse 64   Temp 98 1 °F (36 7 °C)   Ht 5' (1 524 m)   Wt 59 4 kg (131 lb)   BMI 25 58 kg/m²          Physical Exam   Constitutional: She appears well-developed and well-nourished  No distress  Abdominal: Soft  She exhibits no distension and no mass  There is no tenderness  There is no rebound and no guarding  Infraumbilical incision healed well with good cosmesis  Skin: She is not diaphoretic  Vitals reviewed

## 2018-12-06 NOTE — ASSESSMENT & PLAN NOTE
Status post umbilical hernia pair primarily  Doing well  Six week follow-up without any complaints  Nontender on exam   No evidence recurrence  Patient may return to work without lifting restrictions

## 2018-12-18 ENCOUNTER — OFFICE VISIT (OUTPATIENT)
Dept: INTERNAL MEDICINE CLINIC | Facility: CLINIC | Age: 63
End: 2018-12-18
Payer: COMMERCIAL

## 2018-12-18 VITALS
WEIGHT: 130.38 LBS | BODY MASS INDEX: 25.6 KG/M2 | TEMPERATURE: 95.7 F | OXYGEN SATURATION: 97 % | HEART RATE: 70 BPM | SYSTOLIC BLOOD PRESSURE: 118 MMHG | HEIGHT: 60 IN | DIASTOLIC BLOOD PRESSURE: 70 MMHG

## 2018-12-18 DIAGNOSIS — G47.33 OBSTRUCTIVE SLEEP APNEA: ICD-10-CM

## 2018-12-18 DIAGNOSIS — I10 ESSENTIAL HYPERTENSION: Primary | ICD-10-CM

## 2018-12-18 DIAGNOSIS — Z12.31 ENCOUNTER FOR SCREENING MAMMOGRAM FOR MALIGNANT NEOPLASM OF BREAST: ICD-10-CM

## 2018-12-18 DIAGNOSIS — F41.9 ANXIETY: ICD-10-CM

## 2018-12-18 PROCEDURE — 99214 OFFICE O/P EST MOD 30 MIN: CPT | Performed by: INTERNAL MEDICINE

## 2018-12-18 PROCEDURE — 1101F PT FALLS ASSESS-DOCD LE1/YR: CPT | Performed by: INTERNAL MEDICINE

## 2018-12-18 PROCEDURE — 3078F DIAST BP <80 MM HG: CPT | Performed by: INTERNAL MEDICINE

## 2018-12-18 PROCEDURE — 3074F SYST BP LT 130 MM HG: CPT | Performed by: INTERNAL MEDICINE

## 2018-12-18 PROCEDURE — 1036F TOBACCO NON-USER: CPT | Performed by: INTERNAL MEDICINE

## 2018-12-18 PROCEDURE — 3008F BODY MASS INDEX DOCD: CPT | Performed by: INTERNAL MEDICINE

## 2018-12-18 RX ORDER — ALPRAZOLAM 0.5 MG/1
0.5 TABLET ORAL DAILY
Qty: 90 TABLET | Refills: 0 | Status: SHIPPED | OUTPATIENT
Start: 2018-12-18 | End: 2019-08-08 | Stop reason: SDUPTHER

## 2018-12-18 NOTE — PROGRESS NOTES
Assessment/Plan:         Diagnoses and all orders for this visit:    Essential hypertension  No added salt diet  Continue present rx    Encounter for screening mammogram for malignant neoplasm of breast  Pt will schedule    Anxiety  -     ALPRAZolam (XANAX) 0 5 mg tablet; Take 1 tablet (0 5 mg total) by mouth daily for 90 days  She takes as needed    Obstructive sleep apnea  Pt will resume humidified air and has been compliant with cpap with benefit    Rto 6 months/prn           Patient ID: Mendel Old is a 70 y o  female  HPI   Pt had hernia repair 2 months ago and going back to work next week - did well She has some postnasal drip - no cough or fever and realized she has not been humidifying her cpap at night She did get to [facility] and had great time  No chest pain or sob  Review of Systems   Constitutional: Negative  HENT: Negative  Eyes: Negative  Respiratory: Negative  Cardiovascular: Negative  Gastrointestinal: Negative  Endocrine: Negative  Genitourinary: Negative  Musculoskeletal: Negative  Skin: Negative  Allergic/Immunologic: Negative  Neurological: Negative  Hematological: Negative  Psychiatric/Behavioral: Negative        Past Medical History:   Diagnosis Date    CPAP (continuous positive airway pressure) dependence     DJD (degenerative joint disease) of cervical spine     Hyperglycemia     last assessed 16    Hypertension     Medullary sponge kidney     Sleep apnea      Past Surgical History:   Procedure Laterality Date    CATARACT EXTRACTION Left 2011     SECTION      unknown onset    CYSTOSCOPY W/ URETERAL STENT PLACEMENT Right     CYSTOSCOPY W/ URETERAL STENT REMOVAL Right 10/27/2015    right stent    DILATION AND CURETTAGE OF UTERUS      of cervical stump unknown onset    OOPHORECTOMY Left     unknown onset    [place] REPAIR UMBILICAL BQLF,6+B/I,FUFGJ N/A 10/23/2018    Procedure: REPAIR HERNIA UMBILICAL;  Surgeon: Nellie Higuera DO Mary;  Location: [facility];  Service: General    TOTAL ABDOMINAL HYSTERECTOMY      w/ removal of both ovaries, last assessed 8/21/14     Social History     Social History    Marital status: /Civil Union     Spouse name: N/A    Number of children: N/A    Years of education: N/A     Occupational History    Not on file  Social History Main Topics    Smoking status: Never Smoker    Smokeless tobacco: Never Used    Alcohol use Yes      Comment: 3x per week wine or beer    Drug use: No    Sexual activity: Not on file     Other Topics Concern    Not on file     Social History Narrative    Dental care occasionally    Lives with spouse    No advance directives    No caffeine use    Sun screen worn    Uses seatbelt     No Known Allergies          /70   Pulse 70   Temp (!) 95 7 °F (35 4 °C) (Tympanic)   Ht 5' (1 524 m)   Wt 59 1 kg (130 lb 6 oz)   SpO2 97%   BMI 25 46 kg/m²          Physical Exam   Constitutional: She is oriented to person, place, and time  She appears well-developed and well-nourished  No distress  HENT:   Head: Normocephalic and atraumatic  Right Ear: External ear normal    Left Ear: External ear normal    Nose: Nose normal    Mouth/Throat: Oropharyngeal exudate present  Eyes: Pupils are equal, round, and reactive to light  Conjunctivae and EOM are normal  No scleral icterus  Neck: Normal range of motion  Neck supple  No JVD present  Cardiovascular: Normal rate, regular rhythm, normal heart sounds and intact distal pulses  No murmur heard  Pulmonary/Chest: Effort normal and breath sounds normal  No respiratory distress  She has no wheezes  She has no rales  Abdominal: Soft  Bowel sounds are normal    Musculoskeletal: Normal range of motion  Lymphadenopathy:     She has no cervical adenopathy  Neurological: She is alert and oriented to person, place, and time  She has normal reflexes  She displays normal reflexes  No cranial nerve deficit   She exhibits normal muscle tone  Coordination normal    Skin: Skin is warm and dry  She is not diaphoretic  Psychiatric: She has a normal mood and affect  Her behavior is normal  Judgment and thought content normal    Nursing note and vitals reviewed

## 2019-03-07 ENCOUNTER — OFFICE VISIT (OUTPATIENT)
Dept: SLEEP CENTER | Facility: CLINIC | Age: 64
End: 2019-03-07
Payer: COMMERCIAL

## 2019-03-07 VITALS
SYSTOLIC BLOOD PRESSURE: 122 MMHG | HEIGHT: 55 IN | OXYGEN SATURATION: 96 % | WEIGHT: 129 LBS | DIASTOLIC BLOOD PRESSURE: 72 MMHG | BODY MASS INDEX: 29.85 KG/M2

## 2019-03-07 DIAGNOSIS — G47.33 OBSTRUCTIVE SLEEP APNEA: Primary | ICD-10-CM

## 2019-03-07 DIAGNOSIS — G47.9 SLEEP DISTURBANCE: ICD-10-CM

## 2019-03-07 DIAGNOSIS — F51.12 INSUFFICIENT SLEEP SYNDROME: ICD-10-CM

## 2019-03-07 DIAGNOSIS — I10 ESSENTIAL HYPERTENSION: ICD-10-CM

## 2019-03-07 DIAGNOSIS — F41.9 ANXIETY: ICD-10-CM

## 2019-03-07 DIAGNOSIS — R00.2 PALPITATIONS: ICD-10-CM

## 2019-03-07 PROCEDURE — 99214 OFFICE O/P EST MOD 30 MIN: CPT | Performed by: INTERNAL MEDICINE

## 2019-03-07 NOTE — PROGRESS NOTES
Review of Systems      Genitourinary none   Cardiology palpitations/fluttering feeling in the chest   Gastrointestinal none   Neurology none   Constitutional none   Integumentary none   Psychiatry anxiety and depression   Musculoskeletal leg cramps   Pulmonary none   ENT none   Endocrine none   Hematological none

## 2019-03-07 NOTE — PROGRESS NOTES
Follow-Up Note - [facility]   Tash Benitez  70 y o  female  BBL:[birth year]  HXV:[id number]    CC: I saw this patient for follow-up in clinic today for her Sleep Disordered Breathing, Coexisting Sleep and Medical Problems  She comes in today because her machine broke  PFSH, Problem List, Medications & Allergies were reviewed in EMR  Interval changes: none reported  She  has a past medical history of CPAP (continuous positive airway pressure) dependence, DJD (degenerative joint disease) of cervical spine, Hyperglycemia, Hypertension, Medullary sponge kidney, and Sleep apnea  She has a current medication list which includes the following prescription(s): alprazolam, ascorbic acid, atenolol, bupropion, vitamin d3, and lisinopril  ROS: Reviewed (see attached)  COMPLIANCE DATA REVIEWED:  discontinued use of PAP a few days ago  SUBJECTIVE: Regarding use of PAP, Tracee reports:   · Since discontinuing CPAP, she is once again experiencing frequent interruptions of sleep and palpitations  · She typically sleeps alone and is not aware of breathing difficulties however her diagnostic study confirmed moderately severe obstructive sleep apnea  Sleep Routine: She reports getting 5-6 hours sleep on week nights and more on weekends; she has no difficulty initiating, but reports diffculty maintaining sleep   She awakens spontaneously and feels refreshed  She denied excessive drowsiness   She rated herself at Total score: 5 /24 on the [place] sleepiness scale  Habits: reports that she has never smoked  She has never used smokeless tobacco ,  reports that she drinks alcohol ,  reports that she does not use drugs  , Caffeine use: limited , Exercise routine: none   OBJECTIVE: /72   Ht (!) 5" (0 127 m)   Wt 58 5 kg (129 lb)   SpO2 96%   BMI 3627 88 kg/m²    Constitutional: Patient is well groomed; well appearing    Skin/Extrem: warm & dry; col & hydration normal; no edema  Psych: cooperativeand in no distress  Normal mood, affect & thought   CNS: Alert, orientated, clear & coherent speech  H&N: EOMI; NC/AT:no facial pressure marks, no rashes  Neck Circumference: 39 5 cm  ENMT Mucus membranes normal Nasal airway:patent  Oral airway: crowded  Resp:effort is normal CVS: RRR ABD:truncal obesity MSK:Gait normal     ASSESSMENT: Primary Sleep/Secondary(to Medical or Psych conditions) & comorbidities   1  Obstructive sleep apnea     2  Sleep disturbance     3  Insufficient sleep syndrome     4  Anxiety     5  Essential hypertension     6  Palpitations       PLAN:  1  Treatment with  PAP is medically necessary and Lindsay Jiang is agreable to continue use  2  Care of equipment, methods to improve comfort using PAP and importance of compliance with therapy were discussed  3  Pressure setting: continue a to 12 cm H2O     4  The patient's current unit has now reached its 5 yr reasonable, useful life and needs to be replaced  5  Rx provided to replace supplies and Care coordinated with DME provider  6  Strategies for weight reduction were discussed  I also advised allowing sufficient opportunity for sleep  7  Follow-up is advised in 1 year or sooner if needed to monitor progress, compliance and to adjust therapy  Thank you for allowing me to participate in the care of this patient      Sincerely,    Authenticated electronically by Jaden Parisi MD on 09/95/60   Board Certified Specialist

## 2019-04-09 ENCOUNTER — TELEPHONE (OUTPATIENT)
Dept: INTERNAL MEDICINE CLINIC | Facility: CLINIC | Age: 64
End: 2019-04-09

## 2019-04-22 ENCOUNTER — TRANSCRIBE ORDERS (OUTPATIENT)
Dept: ADMINISTRATIVE | Facility: HOSPITAL | Age: 64
End: 2019-04-22

## 2019-04-22 DIAGNOSIS — Z12.39 BREAST SCREENING, UNSPECIFIED: Primary | ICD-10-CM

## 2019-05-03 ENCOUNTER — HOSPITAL ENCOUNTER (OUTPATIENT)
Dept: MAMMOGRAPHY | Facility: HOSPITAL | Age: 64
Discharge: HOME | End: 2019-05-03
Attending: INTERNAL MEDICINE
Payer: COMMERCIAL

## 2019-05-03 VITALS — BODY MASS INDEX: 25.32 KG/M2 | WEIGHT: 129 LBS | HEIGHT: 60 IN

## 2019-05-03 DIAGNOSIS — Z12.39 BREAST SCREENING, UNSPECIFIED: ICD-10-CM

## 2019-05-03 PROCEDURE — 77063 BREAST TOMOSYNTHESIS BI: CPT

## 2019-05-03 PROCEDURE — 77067 SCR MAMMO BI INCL CAD: CPT

## 2019-06-04 DIAGNOSIS — F32.A DEPRESSION, UNSPECIFIED DEPRESSION TYPE: ICD-10-CM

## 2019-06-04 DIAGNOSIS — I10 HYPERTENSION, UNSPECIFIED TYPE: ICD-10-CM

## 2019-06-04 RX ORDER — BUPROPION HYDROCHLORIDE 75 MG/1
75 TABLET ORAL DAILY
Qty: 90 TABLET | Refills: 3 | Status: SHIPPED | OUTPATIENT
Start: 2019-06-04 | End: 2020-06-16 | Stop reason: SDUPTHER

## 2019-06-04 RX ORDER — LISINOPRIL 10 MG/1
10 TABLET ORAL DAILY
Qty: 90 TABLET | Refills: 3 | Status: SHIPPED | OUTPATIENT
Start: 2019-06-04 | End: 2020-06-16 | Stop reason: SDUPTHER

## 2019-06-04 RX ORDER — ATENOLOL 50 MG/1
50 TABLET ORAL DAILY
Qty: 90 TABLET | Refills: 3 | Status: SHIPPED | OUTPATIENT
Start: 2019-06-04 | End: 2020-06-16 | Stop reason: SDUPTHER

## 2019-06-19 ENCOUNTER — OFFICE VISIT (OUTPATIENT)
Dept: INTERNAL MEDICINE CLINIC | Facility: CLINIC | Age: 64
End: 2019-06-19
Payer: COMMERCIAL

## 2019-06-19 VITALS
SYSTOLIC BLOOD PRESSURE: 124 MMHG | OXYGEN SATURATION: 99 % | WEIGHT: 126.6 LBS | TEMPERATURE: 98.2 F | BODY MASS INDEX: 24.85 KG/M2 | HEART RATE: 67 BPM | DIASTOLIC BLOOD PRESSURE: 76 MMHG | HEIGHT: 60 IN

## 2019-06-19 DIAGNOSIS — I10 ESSENTIAL HYPERTENSION: ICD-10-CM

## 2019-06-19 DIAGNOSIS — Z00.00 VISIT FOR PREVENTIVE HEALTH EXAMINATION: Primary | ICD-10-CM

## 2019-06-19 DIAGNOSIS — G47.33 OBSTRUCTIVE SLEEP APNEA: ICD-10-CM

## 2019-06-19 DIAGNOSIS — M81.6 LOCALIZED OSTEOPOROSIS WITHOUT CURRENT PATHOLOGICAL FRACTURE: ICD-10-CM

## 2019-06-19 DIAGNOSIS — Z13.6 SCREENING FOR CARDIOVASCULAR CONDITION: ICD-10-CM

## 2019-06-19 DIAGNOSIS — E04.1 THYROID NODULE: ICD-10-CM

## 2019-06-19 DIAGNOSIS — R73.9 HYPERGLYCEMIA: ICD-10-CM

## 2019-06-19 DIAGNOSIS — Z00.00 ANNUAL PHYSICAL EXAM: ICD-10-CM

## 2019-06-19 PROBLEM — R00.2 PALPITATIONS: Status: RESOLVED | Noted: 2018-06-21 | Resolved: 2019-06-19

## 2019-06-19 PROBLEM — G47.9 SLEEP DISTURBANCE: Status: RESOLVED | Noted: 2018-06-21 | Resolved: 2019-06-19

## 2019-06-19 PROBLEM — R68.2 DRY MOUTH: Status: RESOLVED | Noted: 2018-06-21 | Resolved: 2019-06-19

## 2019-06-19 PROCEDURE — 1160F RVW MEDS BY RX/DR IN RCRD: CPT | Performed by: INTERNAL MEDICINE

## 2019-06-19 PROCEDURE — 99397 PER PM REEVAL EST PAT 65+ YR: CPT | Performed by: INTERNAL MEDICINE

## 2019-08-08 DIAGNOSIS — F41.9 ANXIETY: ICD-10-CM

## 2019-08-08 RX ORDER — ALPRAZOLAM 0.5 MG/1
0.5 TABLET ORAL DAILY
Qty: 90 TABLET | Refills: 0 | Status: SHIPPED | OUTPATIENT
Start: 2019-08-08 | End: 2019-12-10 | Stop reason: SDUPTHER

## 2019-12-07 ENCOUNTER — APPOINTMENT (OUTPATIENT)
Dept: LAB | Facility: HOSPITAL | Age: 64
End: 2019-12-07
Attending: INTERNAL MEDICINE
Payer: COMMERCIAL

## 2019-12-07 DIAGNOSIS — R73.9 HYPERGLYCEMIA: ICD-10-CM

## 2019-12-07 DIAGNOSIS — Z13.6 SCREENING FOR CARDIOVASCULAR CONDITION: ICD-10-CM

## 2019-12-07 LAB
ALBUMIN SERPL BCP-MCNC: 3.6 G/DL (ref 3.5–5)
ALP SERPL-CCNC: 95 U/L (ref 46–116)
ALT SERPL W P-5'-P-CCNC: 46 U/L (ref 12–78)
ANION GAP SERPL CALCULATED.3IONS-SCNC: 11 MMOL/L (ref 4–13)
AST SERPL W P-5'-P-CCNC: 34 U/L (ref 5–45)
BILIRUB SERPL-MCNC: 0.7 MG/DL (ref 0.2–1)
BUN SERPL-MCNC: 15 MG/DL (ref 5–25)
CALCIUM SERPL-MCNC: 8.6 MG/DL (ref 8.3–10.1)
CHLORIDE SERPL-SCNC: 107 MMOL/L (ref 100–108)
CHOLEST SERPL-MCNC: 204 MG/DL (ref 50–200)
CO2 SERPL-SCNC: 25 MMOL/L (ref 21–32)
CREAT SERPL-MCNC: 0.74 MG/DL (ref 0.6–1.3)
EST. AVERAGE GLUCOSE BLD GHB EST-MCNC: 114 MG/DL
GFR SERPL CREATININE-BSD FRML MDRD: 85 ML/MIN/1.73SQ M
GLUCOSE P FAST SERPL-MCNC: 117 MG/DL (ref 65–99)
HBA1C MFR BLD: 5.6 % (ref 4.2–6.3)
HDLC SERPL-MCNC: 59 MG/DL
LDLC SERPL CALC-MCNC: 130 MG/DL (ref 0–100)
NONHDLC SERPL-MCNC: 145 MG/DL
POTASSIUM SERPL-SCNC: 4.1 MMOL/L (ref 3.5–5.3)
PROT SERPL-MCNC: 6.8 G/DL (ref 6.4–8.2)
SODIUM SERPL-SCNC: 143 MMOL/L (ref 136–145)
TRIGL SERPL-MCNC: 75 MG/DL

## 2019-12-07 PROCEDURE — 80053 COMPREHEN METABOLIC PANEL: CPT

## 2019-12-07 PROCEDURE — 83036 HEMOGLOBIN GLYCOSYLATED A1C: CPT

## 2019-12-07 PROCEDURE — 80061 LIPID PANEL: CPT

## 2019-12-07 PROCEDURE — 36415 COLL VENOUS BLD VENIPUNCTURE: CPT

## 2019-12-10 ENCOUNTER — OFFICE VISIT (OUTPATIENT)
Dept: INTERNAL MEDICINE CLINIC | Facility: CLINIC | Age: 64
End: 2019-12-10
Payer: COMMERCIAL

## 2019-12-10 VITALS
WEIGHT: 128 LBS | OXYGEN SATURATION: 95 % | DIASTOLIC BLOOD PRESSURE: 78 MMHG | HEART RATE: 111 BPM | SYSTOLIC BLOOD PRESSURE: 136 MMHG | HEIGHT: 60 IN | TEMPERATURE: 97.8 F | BODY MASS INDEX: 25.13 KG/M2

## 2019-12-10 DIAGNOSIS — Z23 IMMUNIZATION DUE: ICD-10-CM

## 2019-12-10 DIAGNOSIS — G47.33 OBSTRUCTIVE SLEEP APNEA: ICD-10-CM

## 2019-12-10 DIAGNOSIS — F41.9 ANXIETY: ICD-10-CM

## 2019-12-10 DIAGNOSIS — I10 ESSENTIAL HYPERTENSION: Primary | ICD-10-CM

## 2019-12-10 PROBLEM — F51.12 INSUFFICIENT SLEEP SYNDROME: Status: RESOLVED | Noted: 2018-06-21 | Resolved: 2019-12-10

## 2019-12-10 PROCEDURE — 3008F BODY MASS INDEX DOCD: CPT | Performed by: INTERNAL MEDICINE

## 2019-12-10 PROCEDURE — 3075F SYST BP GE 130 - 139MM HG: CPT | Performed by: INTERNAL MEDICINE

## 2019-12-10 PROCEDURE — 90471 IMMUNIZATION ADMIN: CPT

## 2019-12-10 PROCEDURE — 90670 PCV13 VACCINE IM: CPT

## 2019-12-10 PROCEDURE — 1036F TOBACCO NON-USER: CPT | Performed by: INTERNAL MEDICINE

## 2019-12-10 PROCEDURE — 1160F RVW MEDS BY RX/DR IN RCRD: CPT | Performed by: INTERNAL MEDICINE

## 2019-12-10 PROCEDURE — 3078F DIAST BP <80 MM HG: CPT | Performed by: INTERNAL MEDICINE

## 2019-12-10 PROCEDURE — 99214 OFFICE O/P EST MOD 30 MIN: CPT | Performed by: INTERNAL MEDICINE

## 2019-12-10 RX ORDER — ALPRAZOLAM 0.5 MG/1
0.5 TABLET ORAL DAILY
Qty: 90 TABLET | Refills: 0 | Status: SHIPPED | OUTPATIENT
Start: 2019-12-10 | End: 2020-08-10 | Stop reason: SDUPTHER

## 2019-12-10 NOTE — PROGRESS NOTES
Assessment/Plan:         Diagnoses and all orders for this visit:    Essential hypertension  Bp stable Continue present regimen  Increase hydration  Low sodium diet     Obstructive sleep apnea  Compliant with cpap    Anxiety  Doing ok Her  is in and out of NH at present which is more stressful    Recent labs reviewed  Adequate hydration encouraged    Rto 6 months       Patient ID: Maisha Mensah is a 75 y o  female  HPI  Pt doing ok but her  has been in and out of the hospital/[facility] He presently is in the [place] for str  She goes afterwork daily to see him  No chest pain She is more tired due to her schedule/visiting the NH She hopes he can return home She is still working fulltime and managing ok No plan for custodial as of yet She does want refill of anxiety medication as she has had to take a bit more with recent events  She had flu shot at work  No chest pain or sob Using cpap faithfully with benefit       Review of Systems   Constitutional: Negative for chills and fever  HENT: Negative  Respiratory: Negative  Negative for cough and shortness of breath  Cardiovascular: Negative  Gastrointestinal: Negative  Genitourinary: Negative  Musculoskeletal: Positive for arthralgias  Arthritic joints of hands   Skin: Negative  Neurological: Negative for dizziness, light-headedness and headaches  Hematological: Negative  Psychiatric/Behavioral: Negative          Past Medical History:   Diagnosis Date    CPAP (continuous positive airway pressure) dependence     DJD (degenerative joint disease) of cervical spine     Hyperglycemia     last assessed 16    Hypertension     Medullary sponge kidney     Sleep apnea      Past Surgical History:   Procedure Laterality Date    CATARACT EXTRACTION Left 2011     SECTION      unknown onset    CYSTOSCOPY W/ URETERAL STENT PLACEMENT Right     CYSTOSCOPY W/ URETERAL STENT REMOVAL Right 10/27/2015    right stent    DILATION AND CURETTAGE OF UTERUS      of cervical stump unknown onset    OOPHORECTOMY Left     unknown onset    [place] REPAIR UMBILICAL VQEG,1+A/D,FDTSM N/A 10/23/2018    Procedure: REPAIR HERNIA UMBILICAL;  Surgeon: Savage Louie DO;  Location: [facility];  Service: General    TOTAL ABDOMINAL HYSTERECTOMY      w/ removal of both ovaries, last assessed 8/21/14     Social History     Socioeconomic History    Marital status: /Civil Union     Spouse name: Not on file    Number of children: Not on file    Years of education: Not on file    Highest education level: Not on file   Occupational History    Not on file   Social Needs    Financial resource strain: Not on file    Food insecurity:     Worry: Not on file     Inability: Not on file    Transportation needs:     Medical: Not on file     Non-medical: Not on file   Tobacco Use    Smoking status: Never Smoker    Smokeless tobacco: Never Used   Substance and Sexual Activity    Alcohol use: Yes     Comment: 3x per week wine or beer    Drug use: No    Sexual activity: Not on file   Lifestyle    Physical activity:     Days per week: Not on file     Minutes per session: Not on file    Stress: Not on file   Relationships    Social connections:     Talks on phone: Not on file     Gets together: Not on file     Attends Anabaptist service: Not on file     Active member of club or organization: Not on file     Attends meetings of clubs or organizations: Not on file     Relationship status: Not on file    Intimate partner violence:     Fear of current or ex partner: Not on file     Emotionally abused: Not on file     Physically abused: Not on file     Forced sexual activity: Not on file   Other Topics Concern    Not on file   Social History Narrative    Dental care occasionally    Lives with spouse    No advance directives    No caffeine use    Sun screen worn    Uses seatbelt     No Known Allergies BMI Counseling: Body mass index is 25 kg/m²   The BMI is above normal  Nutrition recommendations include moderation in carbohydrate intake  No pharmacotherapy was ordered  /78   Pulse (!) 111   Temp 97 8 °F (36 6 °C) (Tympanic)   Ht 5' (1 524 m)   Wt 58 1 kg (128 lb)   SpO2 95%   BMI 25 00 kg/m²          Physical Exam   Constitutional: She is oriented to person, place, and time  She appears well-developed and well-nourished  No distress  HENT:   Head: Normocephalic and atraumatic  Mouth/Throat: Oropharynx is clear and moist  No oropharyngeal exudate  Eyes: Pupils are equal, round, and reactive to light  Conjunctivae and EOM are normal  No scleral icterus  Neck: Normal range of motion  Neck supple  No JVD present  Cardiovascular: Normal rate and regular rhythm  No murmur heard  Pulmonary/Chest: Effort normal and breath sounds normal  No respiratory distress  She has no wheezes  Abdominal: Soft  Bowel sounds are normal  She exhibits no distension  There is no tenderness  Musculoskeletal: Normal range of motion  She exhibits no edema  Lymphadenopathy:     She has no cervical adenopathy  Neurological: She is alert and oriented to person, place, and time  Skin: Skin is warm and dry  Capillary refill takes less than 2 seconds  She is not diaphoretic  No erythema  Psychiatric: She has a normal mood and affect  Her behavior is normal  Judgment and thought content normal    Nursing note and vitals reviewed

## 2020-06-06 ENCOUNTER — TELEPHONE (OUTPATIENT)
Dept: URGENT CARE | Facility: CLINIC | Age: 65
End: 2020-06-06

## 2020-06-16 ENCOUNTER — OFFICE VISIT (OUTPATIENT)
Dept: INTERNAL MEDICINE CLINIC | Facility: CLINIC | Age: 65
End: 2020-06-16
Payer: COMMERCIAL

## 2020-06-16 VITALS
HEIGHT: 60 IN | WEIGHT: 130 LBS | DIASTOLIC BLOOD PRESSURE: 70 MMHG | TEMPERATURE: 99.6 F | OXYGEN SATURATION: 97 % | SYSTOLIC BLOOD PRESSURE: 136 MMHG | BODY MASS INDEX: 25.52 KG/M2 | HEART RATE: 72 BPM

## 2020-06-16 DIAGNOSIS — Z12.31 SCREENING MAMMOGRAM, ENCOUNTER FOR: ICD-10-CM

## 2020-06-16 DIAGNOSIS — Z13.820 ENCOUNTER FOR OSTEOPOROSIS SCREENING IN ASYMPTOMATIC POSTMENOPAUSAL PATIENT: ICD-10-CM

## 2020-06-16 DIAGNOSIS — F32.A DEPRESSION, UNSPECIFIED DEPRESSION TYPE: ICD-10-CM

## 2020-06-16 DIAGNOSIS — R06.00 DYSPNEA ON EXERTION: ICD-10-CM

## 2020-06-16 DIAGNOSIS — E55.9 VITAMIN D DEFICIENCY: ICD-10-CM

## 2020-06-16 DIAGNOSIS — I10 HYPERTENSION, UNSPECIFIED TYPE: Primary | ICD-10-CM

## 2020-06-16 DIAGNOSIS — Z12.31 ENCOUNTER FOR SCREENING MAMMOGRAM FOR MALIGNANT NEOPLASM OF BREAST: ICD-10-CM

## 2020-06-16 DIAGNOSIS — R25.2 LEG CRAMPS: ICD-10-CM

## 2020-06-16 DIAGNOSIS — Z78.0 ENCOUNTER FOR OSTEOPOROSIS SCREENING IN ASYMPTOMATIC POSTMENOPAUSAL PATIENT: ICD-10-CM

## 2020-06-16 PROCEDURE — 1160F RVW MEDS BY RX/DR IN RCRD: CPT | Performed by: INTERNAL MEDICINE

## 2020-06-16 PROCEDURE — 3078F DIAST BP <80 MM HG: CPT | Performed by: INTERNAL MEDICINE

## 2020-06-16 PROCEDURE — 99214 OFFICE O/P EST MOD 30 MIN: CPT | Performed by: INTERNAL MEDICINE

## 2020-06-16 PROCEDURE — 1036F TOBACCO NON-USER: CPT | Performed by: INTERNAL MEDICINE

## 2020-06-16 PROCEDURE — 3008F BODY MASS INDEX DOCD: CPT | Performed by: INTERNAL MEDICINE

## 2020-06-16 PROCEDURE — 3075F SYST BP GE 130 - 139MM HG: CPT | Performed by: INTERNAL MEDICINE

## 2020-06-16 PROCEDURE — 4040F PNEUMOC VAC/ADMIN/RCVD: CPT | Performed by: INTERNAL MEDICINE

## 2020-06-16 RX ORDER — LISINOPRIL 10 MG/1
10 TABLET ORAL DAILY
Qty: 90 TABLET | Refills: 3 | Status: SHIPPED | OUTPATIENT
Start: 2020-06-16 | End: 2021-07-29 | Stop reason: SDUPTHER

## 2020-06-16 RX ORDER — ATENOLOL 50 MG/1
50 TABLET ORAL DAILY
Qty: 90 TABLET | Refills: 3 | Status: SHIPPED | OUTPATIENT
Start: 2020-06-16 | End: 2021-07-29 | Stop reason: SDUPTHER

## 2020-06-16 RX ORDER — BUPROPION HYDROCHLORIDE 75 MG/1
75 TABLET ORAL DAILY
Qty: 90 TABLET | Refills: 3 | Status: SHIPPED | OUTPATIENT
Start: 2020-06-16 | End: 2021-03-01 | Stop reason: DRUGHIGH

## 2020-07-28 ENCOUNTER — HOSPITAL ENCOUNTER (OUTPATIENT)
Dept: RADIOLOGY | Facility: HOSPITAL | Age: 65
Discharge: HOME | End: 2020-07-28
Attending: INTERNAL MEDICINE
Payer: COMMERCIAL

## 2020-07-28 DIAGNOSIS — R06.00 DYSPNEA ON EXERTION: ICD-10-CM

## 2020-07-28 PROCEDURE — 71046 X-RAY EXAM CHEST 2 VIEWS: CPT

## 2020-07-30 ENCOUNTER — TELEPHONE (OUTPATIENT)
Dept: OTHER | Facility: OTHER | Age: 65
End: 2020-07-30

## 2020-07-30 NOTE — TELEPHONE ENCOUNTER
The patient was called for notification of a test result for COVID-19  The patient did not answer the phone and a voicemail was not received

## 2020-08-07 ENCOUNTER — HOSPITAL ENCOUNTER (OUTPATIENT)
Dept: MAMMOGRAPHY | Facility: HOSPITAL | Age: 65
Discharge: HOME | End: 2020-08-07
Attending: INTERNAL MEDICINE
Payer: COMMERCIAL

## 2020-08-07 ENCOUNTER — HOSPITAL ENCOUNTER (OUTPATIENT)
Dept: BONE DENSITY | Facility: HOSPITAL | Age: 65
Discharge: HOME | End: 2020-08-07
Attending: INTERNAL MEDICINE
Payer: COMMERCIAL

## 2020-08-07 VITALS — BODY MASS INDEX: 25.52 KG/M2 | WEIGHT: 130 LBS | HEIGHT: 60 IN

## 2020-08-07 DIAGNOSIS — Z13.820 ENCOUNTER FOR OSTEOPOROSIS SCREENING IN ASYMPTOMATIC POSTMENOPAUSAL PATIENT: ICD-10-CM

## 2020-08-07 DIAGNOSIS — Z12.31 SCREENING MAMMOGRAM, ENCOUNTER FOR: ICD-10-CM

## 2020-08-07 DIAGNOSIS — Z78.0 ENCOUNTER FOR OSTEOPOROSIS SCREENING IN ASYMPTOMATIC POSTMENOPAUSAL PATIENT: ICD-10-CM

## 2020-08-07 PROCEDURE — 77067 SCR MAMMO BI INCL CAD: CPT

## 2020-08-07 PROCEDURE — 77080 DXA BONE DENSITY AXIAL: CPT

## 2020-08-07 PROCEDURE — 77063 BREAST TOMOSYNTHESIS BI: CPT

## 2020-08-10 DIAGNOSIS — F41.9 ANXIETY: ICD-10-CM

## 2020-08-10 RX ORDER — ALPRAZOLAM 0.5 MG/1
0.5 TABLET ORAL DAILY
Qty: 90 TABLET | Refills: 0 | Status: SHIPPED | OUTPATIENT
Start: 2020-08-10 | End: 2021-03-01 | Stop reason: SDUPTHER

## 2020-08-30 ENCOUNTER — APPOINTMENT (OUTPATIENT)
Dept: LAB | Facility: HOSPITAL | Age: 65
End: 2020-08-30
Attending: INTERNAL MEDICINE
Payer: COMMERCIAL

## 2020-08-30 DIAGNOSIS — E55.9 VITAMIN D DEFICIENCY: ICD-10-CM

## 2020-08-30 DIAGNOSIS — R25.2 LEG CRAMPS: ICD-10-CM

## 2020-08-30 LAB
25(OH)D3 SERPL-MCNC: 27.9 NG/ML (ref 30–100)
ALBUMIN SERPL BCP-MCNC: 3.8 G/DL (ref 3.5–5)
ALP SERPL-CCNC: 97 U/L (ref 46–116)
ALT SERPL W P-5'-P-CCNC: 39 U/L (ref 12–78)
ANION GAP SERPL CALCULATED.3IONS-SCNC: 8 MMOL/L (ref 4–13)
AST SERPL W P-5'-P-CCNC: 24 U/L (ref 5–45)
BASOPHILS # BLD AUTO: 0.04 THOUSANDS/ΜL (ref 0–0.1)
BASOPHILS NFR BLD AUTO: 1 % (ref 0–1)
BILIRUB SERPL-MCNC: 0.6 MG/DL (ref 0.2–1)
BUN SERPL-MCNC: 14 MG/DL (ref 5–25)
CALCIUM SERPL-MCNC: 8.6 MG/DL (ref 8.3–10.1)
CHLORIDE SERPL-SCNC: 101 MMOL/L (ref 100–108)
CO2 SERPL-SCNC: 27 MMOL/L (ref 21–32)
CREAT SERPL-MCNC: 0.83 MG/DL (ref 0.6–1.3)
EOSINOPHIL # BLD AUTO: 0.04 THOUSAND/ΜL (ref 0–0.61)
EOSINOPHIL NFR BLD AUTO: 1 % (ref 0–6)
ERYTHROCYTE [DISTWIDTH] IN BLOOD BY AUTOMATED COUNT: 11.4 % (ref 11.6–15.1)
EST. AVERAGE GLUCOSE BLD GHB EST-MCNC: 120 MG/DL
GFR SERPL CREATININE-BSD FRML MDRD: 74 ML/MIN/1.73SQ M
GLUCOSE P FAST SERPL-MCNC: 122 MG/DL (ref 65–99)
HBA1C MFR BLD: 5.8 %
HCT VFR BLD AUTO: 43 % (ref 34.8–46.1)
HGB BLD-MCNC: 14.6 G/DL (ref 11.5–15.4)
IMM GRANULOCYTES # BLD AUTO: 0.01 THOUSAND/UL (ref 0–0.2)
IMM GRANULOCYTES NFR BLD AUTO: 0 % (ref 0–2)
LYMPHOCYTES # BLD AUTO: 1.39 THOUSANDS/ΜL (ref 0.6–4.47)
LYMPHOCYTES NFR BLD AUTO: 27 % (ref 14–44)
MAGNESIUM SERPL-MCNC: 1.8 MG/DL (ref 1.6–2.6)
MCH RBC QN AUTO: 32.4 PG (ref 26.8–34.3)
MCHC RBC AUTO-ENTMCNC: 34 G/DL (ref 31.4–37.4)
MCV RBC AUTO: 95 FL (ref 82–98)
MONOCYTES # BLD AUTO: 0.45 THOUSAND/ΜL (ref 0.17–1.22)
MONOCYTES NFR BLD AUTO: 9 % (ref 4–12)
NEUTROPHILS # BLD AUTO: 3.26 THOUSANDS/ΜL (ref 1.85–7.62)
NEUTS SEG NFR BLD AUTO: 62 % (ref 43–75)
NRBC BLD AUTO-RTO: 0 /100 WBCS
PLATELET # BLD AUTO: 210 THOUSANDS/UL (ref 149–390)
PMV BLD AUTO: 10.6 FL (ref 8.9–12.7)
POTASSIUM SERPL-SCNC: 4.5 MMOL/L (ref 3.5–5.3)
PROT SERPL-MCNC: 7.1 G/DL (ref 6.4–8.2)
RBC # BLD AUTO: 4.51 MILLION/UL (ref 3.81–5.12)
SODIUM SERPL-SCNC: 136 MMOL/L (ref 136–145)
TSH SERPL DL<=0.05 MIU/L-ACNC: 1.41 UIU/ML (ref 0.36–3.74)
VIT B12 SERPL-MCNC: 431 PG/ML (ref 100–900)
WBC # BLD AUTO: 5.19 THOUSAND/UL (ref 4.31–10.16)

## 2020-08-30 PROCEDURE — 84443 ASSAY THYROID STIM HORMONE: CPT

## 2020-08-30 PROCEDURE — 82306 VITAMIN D 25 HYDROXY: CPT

## 2020-08-30 PROCEDURE — 85025 COMPLETE CBC W/AUTO DIFF WBC: CPT

## 2020-08-30 PROCEDURE — 36415 COLL VENOUS BLD VENIPUNCTURE: CPT

## 2020-08-30 PROCEDURE — 83735 ASSAY OF MAGNESIUM: CPT

## 2020-08-30 PROCEDURE — 80053 COMPREHEN METABOLIC PANEL: CPT

## 2020-08-30 PROCEDURE — 83036 HEMOGLOBIN GLYCOSYLATED A1C: CPT

## 2020-08-30 PROCEDURE — 82607 VITAMIN B-12: CPT

## 2020-08-31 ENCOUNTER — TELEPHONE (OUTPATIENT)
Dept: INTERNAL MEDICINE CLINIC | Facility: CLINIC | Age: 65
End: 2020-08-31

## 2020-08-31 DIAGNOSIS — R73.09 ELEVATED HEMOGLOBIN A1C: Primary | ICD-10-CM

## 2020-08-31 NOTE — TELEPHONE ENCOUNTER
----- Message from Sarabjit Sierra DO sent at 8/31/2020  6:12 AM EDT -----  Vit d still low recommend 5000 iu daily  Sugar aveargae went up slightly as did fasting sugar - lo carb diet no medication needed at this level but recheck a1c 6 months

## 2020-09-18 ENCOUNTER — TELEPHONE (OUTPATIENT)
Dept: OTHER | Facility: OTHER | Age: 65
End: 2020-09-18

## 2020-09-18 NOTE — TELEPHONE ENCOUNTER
The patient was called for notification of a test result for COVID-19  The patient did not answer the phone and a voicemail was left requesting a call back to [phone], Option 7

## 2020-09-29 ENCOUNTER — OFFICE VISIT (OUTPATIENT)
Dept: INTERNAL MEDICINE CLINIC | Facility: CLINIC | Age: 65
End: 2020-09-29
Payer: COMMERCIAL

## 2020-09-29 VITALS
DIASTOLIC BLOOD PRESSURE: 72 MMHG | OXYGEN SATURATION: 97 % | SYSTOLIC BLOOD PRESSURE: 130 MMHG | HEIGHT: 60 IN | WEIGHT: 126 LBS | TEMPERATURE: 97.7 F | BODY MASS INDEX: 24.74 KG/M2 | HEART RATE: 69 BPM

## 2020-09-29 DIAGNOSIS — Z00.00 ANNUAL PHYSICAL EXAM: Primary | ICD-10-CM

## 2020-09-29 PROCEDURE — 99397 PER PM REEVAL EST PAT 65+ YR: CPT | Performed by: INTERNAL MEDICINE

## 2020-09-29 NOTE — PROGRESS NOTES
[facility] INTERNAL MEDICINE AT [facility]    NAME: Mikey Stewart  AGE: 77 y o  SEX: female  : 1955     DATE: 2020     Assessment and Plan:     Problem List Items Addressed This Visit     None      Visit Diagnoses     Annual physical exam    -  Primary      Pt is watching her carbs and has lost a few pounds   She will try to walk more Take 5000 iu vit d daily  Increase fluids, water intake   Flu shot at work   Rto 6 months     Immunizations and preventive care screenings were discussed with patient today  Appropriate education was printed on patient's after visit summary  Counseling:  · Exercise: the importance of regular exercise/physical activity was discussed  Recommend exercise 3-5 times per week for at least 30 minutes  No follow-ups on file  Chief Complaint:     Chief Complaint   Patient presents with    Annual Exam      History of Present Illness:     Adult Annual Physical   Patient here for a comprehensive physical exam  The patient reports no problems  Diet and Physical Activity  · Diet/Nutrition: low carb diet  · Exercise: walking  Depression Screening  PHQ-9 Depression Screening    PHQ-9:    Frequency of the following problems over the past two weeks:       Little interest or pleasure in doing things:  0 - not at all  Feeling down, depressed, or hopeless:  0 - not at all  PHQ-2 Score:  0       General Health  · Sleep: gets 4-6 hours of sleep on average  · Hearing: normal - bilateral   · Vision: wears glasses  · Dental: regular dental visits  /GYN Health  · Patient is: postmenopausal  · Last menstrual period: years   · Contraceptive method: n/a  Review of Systems:     Review of Systems   Constitutional: Negative for chills and fever  HENT: Negative  Respiratory: Negative for cough and shortness of breath  Cardiovascular: Negative for chest pain and leg swelling     Gastrointestinal: Negative  Negative for abdominal distention, abdominal pain, constipation and diarrhea  Genitourinary: Negative  Musculoskeletal: Negative  Skin: Negative  Neurological: Negative for dizziness, light-headedness and headaches  Psychiatric/Behavioral: Positive for sleep disturbance        Past Medical History:     Past Medical History:   Diagnosis Date    CPAP (continuous positive airway pressure) dependence     DJD (degenerative joint disease) of cervical spine     Hyperglycemia     last assessed 16    Hypertension     Medullary sponge kidney     Sleep apnea       Past Surgical History:     Past Surgical History:   Procedure Laterality Date    CATARACT EXTRACTION Left 2011     SECTION      unknown onset    CYSTOSCOPY W/ URETERAL STENT PLACEMENT Right     CYSTOSCOPY W/ URETERAL STENT REMOVAL Right 10/27/2015    right stent    DILATION AND CURETTAGE OF UTERUS      of cervical stump unknown onset    OOPHORECTOMY Left     unknown onset    AR REPAIR UMBILICAL IIJB,9+E/Q,OVWXY N/A 10/23/2018    Procedure: REPAIR HERNIA UMBILICAL;  Surgeon: Tyree Rai DO;  Location: [facility];  Service: General    TOTAL ABDOMINAL HYSTERECTOMY      w/ removal of both ovaries, last assessed 14      Social History:     E-Cigarette/Vaping    E-Cigarette Use Never User      E-Cigarette/Vaping Substances    Nicotine No     THC No     CBD No     Flavoring No     Other No     Unknown No      Social History     Socioeconomic History    Marital status: /Civil Union     Spouse name: None    Number of children: None    Years of education: None    Highest education level: None   Occupational History    None   Social Needs    Financial resource strain: None    Food insecurity     Worry: None     Inability: None    Transportation needs     Medical: None     Non-medical: None   Tobacco Use    Smoking status: Never Smoker    Smokeless tobacco: Never Used   Substance and Sexual Activity    Alcohol use: Yes     Comment: 3x per week wine or beer    Drug use: No    Sexual activity: None   Lifestyle    Physical activity     Days per week: None     Minutes per session: None    Stress: None   Relationships    Social connections     Talks on phone: None     Gets together: None     Attends Mandaeism service: None     Active member of club or organization: None     Attends meetings of clubs or organizations: None     Relationship status: None    Intimate partner violence     Fear of current or ex partner: None     Emotionally abused: None     Physically abused: None     Forced sexual activity: None   Other Topics Concern    None   Social History Narrative    Dental care occasionally    Lives with spouse    No advance directives    No caffeine use    Sun screen worn    Uses seatbelt      Family History:     Family History   Problem Relation Age of Onset    Coronary artery disease Mother     Hypertension Mother     Stroke Mother         syndrome    Diabetes Family     Pancreatic cancer Father     No Known Problems Daughter     No Known Problems Maternal Grandmother     No Known Problems Maternal Grandfather     Endometrial cancer Paternal Grandmother     Leukemia Paternal Grandfather     Lung cancer Paternal Aunt     Lung cancer Paternal Uncle       Current Medications:     Current Outpatient Medications   Medication Sig Dispense Refill    ALPRAZolam (XANAX) 0 5 mg tablet Take 1 tablet (0 5 mg total) by mouth daily 90 tablet 0    ascorbic acid (VITAMIN C) 500 mg tablet Take 500 mg by mouth daily      atenolol (TENORMIN) 50 mg tablet Take 1 tablet (50 mg total) by mouth daily 90 tablet 3    buPROPion (WELLBUTRIN) 75 mg tablet Take 1 tablet (75 mg total) by mouth daily 90 tablet 3    Cholecalciferol (VITAMIN D3) 2000 units capsule Take 5,000 Units by mouth       lisinopril (ZESTRIL) 10 mg tablet Take 1 tablet (10 mg total) by mouth daily 90 tablet 3     No current facility-administered medications for this visit  Allergies:     No Known Allergies   Physical Exam:     /72   Pulse 69   Temp 97 7 °F (36 5 °C) (Temporal)   Ht 5' (1 524 m)   Wt 57 2 kg (126 lb)   SpO2 97%   BMI 24 61 kg/m²     Physical Exam  Vitals signs reviewed  Constitutional:       General: She is not in acute distress  Appearance: Normal appearance  She is normal weight  She is not ill-appearing, toxic-appearing or diaphoretic  HENT:      Head: Normocephalic and atraumatic  Right Ear: Tympanic membrane, ear canal and external ear normal  There is no impacted cerumen  Left Ear: Tympanic membrane, ear canal and external ear normal  There is no impacted cerumen  Nose: Nose normal  No congestion or rhinorrhea  Mouth/Throat:      Mouth: Mucous membranes are moist       Pharynx: No oropharyngeal exudate  Eyes:      General: No scleral icterus  Extraocular Movements: Extraocular movements intact  Conjunctiva/sclera: Conjunctivae normal       Pupils: Pupils are equal, round, and reactive to light  Neck:      Musculoskeletal: Normal range of motion and neck supple  No muscular tenderness  Cardiovascular:      Rate and Rhythm: Normal rate and regular rhythm  Pulses: Normal pulses  Heart sounds: Normal heart sounds  Pulmonary:      Effort: Pulmonary effort is normal  No respiratory distress  Breath sounds: Normal breath sounds  No wheezing  Abdominal:      General: Bowel sounds are normal  There is no distension  Palpations: Abdomen is soft  Tenderness: There is no abdominal tenderness  Musculoskeletal: Normal range of motion  General: No tenderness or deformity  Skin:     General: Skin is dry  Capillary Refill: Capillary refill takes less than 2 seconds  Neurological:      General: No focal deficit present  Mental Status: She is alert and oriented to person, place, and time  Mental status is at baseline  Cranial Nerves: No cranial nerve deficit  Motor: No weakness  Psychiatric:         Mood and Affect: Mood normal          Behavior: Behavior normal          Thought Content:  Thought content normal          Judgment: Judgment normal           Amador Mujica DO  [facility] INTERNAL MEDICINE AT [facility]

## 2020-09-29 NOTE — PATIENT INSTRUCTIONS

## 2020-10-08 ENCOUNTER — TELEPHONE (OUTPATIENT)
Dept: INTERNAL MEDICINE CLINIC | Facility: CLINIC | Age: 65
End: 2020-10-08

## 2020-12-21 ENCOUNTER — TELEPHONE (OUTPATIENT)
Dept: INTERNAL MEDICINE CLINIC | Facility: CLINIC | Age: 65
End: 2020-12-21

## 2020-12-22 ENCOUNTER — IMMUNIZATIONS (OUTPATIENT)
Dept: FAMILY MEDICINE CLINIC | Facility: HOSPITAL | Age: 65
End: 2020-12-22

## 2020-12-22 DIAGNOSIS — Z23 ENCOUNTER FOR IMMUNIZATION: ICD-10-CM

## 2021-01-09 ENCOUNTER — OFFICE VISIT (OUTPATIENT)
Dept: URGENT CARE | Facility: CLINIC | Age: 66
End: 2021-01-09
Payer: COMMERCIAL

## 2021-01-09 VITALS
DIASTOLIC BLOOD PRESSURE: 61 MMHG | WEIGHT: 126 LBS | OXYGEN SATURATION: 98 % | SYSTOLIC BLOOD PRESSURE: 137 MMHG | TEMPERATURE: 97.9 F | HEIGHT: 60 IN | BODY MASS INDEX: 24.74 KG/M2 | HEART RATE: 64 BPM

## 2021-01-09 DIAGNOSIS — B02.9 HERPES ZOSTER WITHOUT COMPLICATION: Primary | ICD-10-CM

## 2021-01-09 PROCEDURE — G0382 LEV 3 HOSP TYPE B ED VISIT: HCPCS | Performed by: PHYSICIAN ASSISTANT

## 2021-01-09 RX ORDER — VALACYCLOVIR HYDROCHLORIDE 1 G/1
1000 TABLET, FILM COATED ORAL 3 TIMES DAILY
Qty: 30 TABLET | Refills: 0 | Status: SHIPPED | OUTPATIENT
Start: 2021-01-09 | End: 2021-01-19

## 2021-01-09 NOTE — LETTER
January 9, 2021     Patient: Kimmie Dominguez   YOB: 1955   Date of Visit: 1/9/2021       To Whom It May Concern: It is my medical opinion that Kimmie Dominguez may return to work with no restrictions  If you have any questions or concerns, please don't hesitate to call  Sincerely,        Donaldo Avila PA-C    CC:  Kimmie Seeson

## 2021-01-09 NOTE — PROGRESS NOTES
[facility]    NAME: Reji Carbajal is a 77 y o  female  : 1955    MRN: [id number]  DATE: 2021  TIME: 4:48 PM    Assessment and Plan   Herpes zoster without complication [P97 0]  1  Herpes zoster without complication  valACYclovir (VALTREX) 1,000 mg tablet       Patient Instructions     Patient Instructions   Valtrex as directed  Follow up with pcp if not improving or if getting worse  Chief Complaint     Chief Complaint   Patient presents with    Rash       History of Present Illness   This 25-year-old female here with complaint of itchy and burning rash on the right flank area  Started about 2 days ago and now is getting more burning in sensation  Patient recently had her initial COVID-19 vaccine a week ago  Review of Systems   Review of Systems   Constitutional: Negative for appetite change, chills and fever  HENT: Negative for congestion, ear discharge, ear pain, facial swelling, postnasal drip, sinus pressure, sneezing and sore throat  Respiratory: Negative for cough, shortness of breath and wheezing  Skin: Positive for rash  Neurological: Negative for headaches         Current Medications     Current Outpatient Medications:     ALPRAZolam (XANAX) 0 5 mg tablet, Take 1 tablet (0 5 mg total) by mouth daily, Disp: 90 tablet, Rfl: 0    ascorbic acid (VITAMIN C) 500 mg tablet, Take 500 mg by mouth daily, Disp: , Rfl:     atenolol (TENORMIN) 50 mg tablet, Take 1 tablet (50 mg total) by mouth daily, Disp: 90 tablet, Rfl: 3    buPROPion (WELLBUTRIN) 75 mg tablet, Take 1 tablet (75 mg total) by mouth daily, Disp: 90 tablet, Rfl: 3    Cholecalciferol (VITAMIN D3) 2000 units capsule, Take 5,000 Units by mouth , Disp: , Rfl:     lisinopril (ZESTRIL) 10 mg tablet, Take 1 tablet (10 mg total) by mouth daily, Disp: 90 tablet, Rfl: 3    valACYclovir (VALTREX) 1,000 mg tablet, Take 1 tablet (1,000 mg total) by mouth 3 (three) times a day for 10 days, Disp: 30 tablet, Rfl: 0    Current Allergies     Allergies as of 2021    (No Known Allergies)          The following portions of the patient's history were reviewed and updated as appropriate: allergies, current medications, past family history, past medical history, past social history, past surgical history and problem list    Past Medical History:   Diagnosis Date    CPAP (continuous positive airway pressure) dependence     DJD (degenerative joint disease) of cervical spine     Hyperglycemia     last assessed 16    Hypertension     Medullary sponge kidney     Sleep apnea      Past Surgical History:   Procedure Laterality Date    CATARACT EXTRACTION Left 2011     SECTION      unknown onset    CYSTOSCOPY W/ URETERAL STENT PLACEMENT Right     CYSTOSCOPY W/ URETERAL STENT REMOVAL Right 10/27/2015    right stent    DILATION AND CURETTAGE OF UTERUS      of cervical stump unknown onset    OOPHORECTOMY Left     unknown onset    VT REPAIR UMBILICAL TGWO,3+Z/U,DSNPG N/A 10/23/2018    Procedure: REPAIR HERNIA UMBILICAL;  Surgeon: Eliza Correa DO;  Location: [facility];  Service: General    TOTAL ABDOMINAL HYSTERECTOMY      w/ removal of both ovaries, last assessed 14     Family History   Problem Relation Age of Onset    Coronary artery disease Mother     Hypertension Mother     Stroke Mother         syndrome    Diabetes Family     Pancreatic cancer Father     No Known Problems Daughter     No Known Problems Maternal Grandmother     No Known Problems Maternal Grandfather     Endometrial cancer Paternal Grandmother     Leukemia Paternal Grandfather     Lung cancer Paternal Aunt     Lung cancer Paternal Uncle      Social History     Socioeconomic History    Marital status: /Civil Union     Spouse name: Not on file    Number of children: Not on file    Years of education: Not on file    Highest education level: Not on file   Occupational History    Not on file   Social Needs  Financial resource strain: Not on file   Eddie-Srinath insecurity     Worry: Not on file     Inability: Not on file   SIRS-Lab needs     Medical: Not on file     Non-medical: Not on file   Tobacco Use    Smoking status: Never Smoker    Smokeless tobacco: Never Used   Substance and Sexual Activity    Alcohol use: Yes     Comment: 3x per week wine or beer    Drug use: No    Sexual activity: Not on file   Lifestyle    Physical activity     Days per week: Not on file     Minutes per session: Not on file    Stress: Not on file   Relationships    Social connections     Talks on phone: Not on file     Gets together: Not on file     Attends Christianity service: Not on file     Active member of club or organization: Not on file     Attends meetings of clubs or organizations: Not on file     Relationship status: Not on file    Intimate partner violence     Fear of current or ex partner: Not on file     Emotionally abused: Not on file     Physically abused: Not on file     Forced sexual activity: Not on file   Other Topics Concern    Not on file   Social History Narrative    Dental care occasionally    Lives with spouse    No advance directives    No caffeine use    Sun screen worn    Uses seatbelt     Medications have been verified  Objective   /61   Pulse 64   Temp 97 9 °F (36 6 °C)   Ht 5' (1 524 m)   Wt 57 2 kg (126 lb)   SpO2 98%   BMI 24 61 kg/m²      Physical Exam   Physical Exam  Vitals signs and nursing note reviewed  Constitutional:       General: She is not in acute distress  Appearance: She is well-developed  HENT:      Head: Normocephalic and atraumatic  Right Ear: Tympanic membrane normal       Left Ear: Tympanic membrane normal       Nose: Nose normal  No mucosal edema or rhinorrhea  Right Sinus: No maxillary sinus tenderness or frontal sinus tenderness  Left Sinus: No maxillary sinus tenderness or frontal sinus tenderness        Mouth/Throat:      Pharynx: No oropharyngeal exudate or posterior oropharyngeal erythema  Eyes:      Conjunctiva/sclera: Conjunctivae normal    Cardiovascular:      Rate and Rhythm: Normal rate and regular rhythm  Heart sounds: Normal heart sounds  No murmur  Abdominal:      General: Abdomen is flat  Skin:     Findings: Erythema (erythema right flank with one small blister  nothing open) present

## 2021-01-11 ENCOUNTER — OFFICE VISIT (OUTPATIENT)
Dept: INTERNAL MEDICINE CLINIC | Facility: CLINIC | Age: 66
End: 2021-01-11
Payer: COMMERCIAL

## 2021-01-11 ENCOUNTER — TELEPHONE (OUTPATIENT)
Dept: INTERNAL MEDICINE CLINIC | Facility: CLINIC | Age: 66
End: 2021-01-11

## 2021-01-11 VITALS
WEIGHT: 126.38 LBS | BODY MASS INDEX: 24.81 KG/M2 | SYSTOLIC BLOOD PRESSURE: 124 MMHG | HEIGHT: 60 IN | DIASTOLIC BLOOD PRESSURE: 76 MMHG | TEMPERATURE: 97.3 F

## 2021-01-11 DIAGNOSIS — B02.9 HERPES ZOSTER WITHOUT COMPLICATION: Primary | ICD-10-CM

## 2021-01-11 PROCEDURE — 99213 OFFICE O/P EST LOW 20 MIN: CPT | Performed by: INTERNAL MEDICINE

## 2021-01-11 NOTE — PROGRESS NOTES
Assessment/Plan:      Diagnoses and all orders for this visit:    Herpes zoster without complication  Finish antiviral rx Rest, fl;uids  RTW on Thursday                Patient ID: Chanel Multani is a 77 y o  female  HPI   Pt went to  on Saturday due to pain following an itch right flank   She was dx with shingles and is on antiviral rx She was off today and is feeling sluggish Pain is better than it was No itch No blisters no further rash Aside from fatigue feeling ok and tolerating Rx from    She is scheduled for covid vaccine next week     Review of Systems   Constitutional: Negative for chills, fatigue and fever  HENT: Negative for congestion  Respiratory: Negative for cough, chest tightness and shortness of breath  Cardiovascular: Negative for chest pain  Skin: Positive for rash  Residual one area no blistering no redness no erythema     Neurological: Negative for dizziness, light-headedness and headaches  Hematological: Negative for adenopathy  Does not bruise/bleed easily         Past Medical History:   Diagnosis Date    CPAP (continuous positive airway pressure) dependence     DJD (degenerative joint disease) of cervical spine     Hyperglycemia     last assessed 16    Hypertension     Medullary sponge kidney     Sleep apnea      Past Surgical History:   Procedure Laterality Date    CATARACT EXTRACTION Left 2011     SECTION      unknown onset    CYSTOSCOPY W/ URETERAL STENT PLACEMENT Right     CYSTOSCOPY W/ URETERAL STENT REMOVAL Right 10/27/2015    right stent    DILATION AND CURETTAGE OF UTERUS      of cervical stump unknown onset    OOPHORECTOMY Left     unknown onset    MT REPAIR UMBILICAL RLLL,0+E/T,RETHV N/A 10/23/2018    Procedure: REPAIR HERNIA UMBILICAL;  Surgeon: Ashutosh White DO;  Location: [facility];  Service: General    TOTAL ABDOMINAL HYSTERECTOMY      w/ removal of both ovaries, last assessed 14     Social History     Socioeconomic History    Marital status: /Civil Union     Spouse name: Not on file    Number of children: Not on file    Years of education: Not on file    Highest education level: Not on file   Occupational History    Not on file   Social Needs    Financial resource strain: Not on file    Food insecurity     Worry: Not on file     Inability: Not on file    Transportation needs     Medical: Not on file     Non-medical: Not on file   Tobacco Use    Smoking status: Never Smoker    Smokeless tobacco: Never Used   Substance and Sexual Activity    Alcohol use: Yes     Comment: 3x per week wine or beer    Drug use: No    Sexual activity: Not on file   Lifestyle    Physical activity     Days per week: Not on file     Minutes per session: Not on file    Stress: Not on file   Relationships    Social connections     Talks on phone: Not on file     Gets together: Not on file     Attends Faith service: Not on file     Active member of club or organization: Not on file     Attends meetings of clubs or organizations: Not on file     Relationship status: Not on file    Intimate partner violence     Fear of current or ex partner: Not on file     Emotionally abused: Not on file     Physically abused: Not on file     Forced sexual activity: Not on file   Other Topics Concern    Not on file   Social History Narrative    Dental care occasionally    Lives with spouse    No advance directives    No caffeine use    Sun screen worn    Uses seatbelt     No Known Allergies    Vitals:    01/11/21 1520 01/11/21 1527   BP:  124/76   Temp: (!) 97 3 °F (36 3 °C)    TempSrc: Temporal    Weight: 57 3 kg (126 lb 6 oz)    Height: 5' (1 524 m)            Physical Exam  Vitals signs reviewed  Constitutional:       General: She is not in acute distress  Appearance: Normal appearance  She is not ill-appearing, toxic-appearing or diaphoretic  HENT:      Head: Normocephalic and atraumatic        Right Ear: Tympanic membrane normal  Left Ear: Tympanic membrane normal       Nose: Nose normal    Eyes:      General: No scleral icterus  Cardiovascular:      Rate and Rhythm: Normal rate and regular rhythm  Pulses: Normal pulses  Heart sounds: Normal heart sounds  No murmur  Skin:     Coloration: Skin is not pale  Findings: Rash present  No erythema  Neurological:      General: No focal deficit present  Mental Status: She is alert and oriented to person, place, and time  Mental status is at baseline  Cranial Nerves: No cranial nerve deficit  Psychiatric:         Mood and Affect: Mood normal          Behavior: Behavior normal          Thought Content:  Thought content normal          Judgment: Judgment normal

## 2021-01-19 ENCOUNTER — TELEPHONE (OUTPATIENT)
Dept: INTERNAL MEDICINE CLINIC | Facility: CLINIC | Age: 66
End: 2021-01-19

## 2021-01-19 NOTE — TELEPHONE ENCOUNTER
Patient asking if you decided on wether she should get her 2nd covid vaccine or not? She is scheduled for tomorrow  Finishing the last of her medication today  No rash

## 2021-01-20 ENCOUNTER — IMMUNIZATIONS (OUTPATIENT)
Dept: FAMILY MEDICINE CLINIC | Facility: HOSPITAL | Age: 66
End: 2021-01-20

## 2021-01-20 DIAGNOSIS — Z23 ENCOUNTER FOR IMMUNIZATION: Primary | ICD-10-CM

## 2021-01-20 PROCEDURE — 0012A SARS-COV-2 / COVID-19 MRNA VACCINE (MODERNA) 100 MCG: CPT

## 2021-01-20 PROCEDURE — 91301 SARS-COV-2 / COVID-19 MRNA VACCINE (MODERNA) 100 MCG: CPT

## 2021-02-12 NOTE — ANESTHESIA POSTPROCEDURE EVALUATION
Post-Op Assessment Note      CV Status:  Stable    Mental Status:  Somnolent    Hydration Status:  Stable    PONV Controlled:  None    Airway Patency:  Patent    Post Op Vitals Reviewed: Yes          Staff: CRNA           BP   128/60   Temp  97 3   Pulse  63   Resp   16   SpO2   100 Spoke with patient and informed her of results and recommendation, patient amenable to plan, order placed and appointment made. No additional questions or concerns.

## 2021-02-25 ENCOUNTER — OFFICE VISIT (OUTPATIENT)
Dept: SLEEP CENTER | Facility: CLINIC | Age: 66
End: 2021-02-25
Payer: COMMERCIAL

## 2021-02-25 VITALS
HEIGHT: 60 IN | OXYGEN SATURATION: 96 % | WEIGHT: 129 LBS | HEART RATE: 79 BPM | SYSTOLIC BLOOD PRESSURE: 90 MMHG | DIASTOLIC BLOOD PRESSURE: 60 MMHG | BODY MASS INDEX: 25.32 KG/M2 | TEMPERATURE: 97.8 F

## 2021-02-25 DIAGNOSIS — G47.33 OBSTRUCTIVE SLEEP APNEA: Primary | ICD-10-CM

## 2021-02-25 DIAGNOSIS — G47.9 SLEEP DISTURBANCE: ICD-10-CM

## 2021-02-25 DIAGNOSIS — F41.9 ANXIETY: ICD-10-CM

## 2021-02-25 DIAGNOSIS — R68.2 DRY MOUTH: ICD-10-CM

## 2021-02-25 DIAGNOSIS — I10 ESSENTIAL HYPERTENSION: ICD-10-CM

## 2021-02-25 DIAGNOSIS — K21.9 GASTROESOPHAGEAL REFLUX DISEASE, UNSPECIFIED WHETHER ESOPHAGITIS PRESENT: ICD-10-CM

## 2021-02-25 PROCEDURE — 99214 OFFICE O/P EST MOD 30 MIN: CPT | Performed by: INTERNAL MEDICINE

## 2021-02-25 NOTE — PROGRESS NOTES
Follow-Up Note - Sleep Center   Aniceto Forrester  77 y o  female  YSM:[id number]  EGV:[id number]    CC: I saw this patient for follow-up in clinic today for Sleep disordered breathing, Coexisting Sleep and Medical Problems  She was last seen in 2019  She missed her last appointment after death of her   PFSH, Problem List, Medications & Allergies were reviewed in EMR  Interval changes: none reported  She  has a past medical history of CPAP (continuous positive airway pressure) dependence, DJD (degenerative joint disease) of cervical spine, Hyperglycemia, Hypertension, Medullary sponge kidney, and Sleep apnea  She has a current medication list which includes the following prescription(s): alprazolam, ascorbic acid, atenolol, bupropion, vitamin d3, lisinopril, and valacyclovir  ROS: as attached  Significant for weight has been stable  She has diet related acid reflux        PHYSIOLOGICAL DATA REVIEW AND INTERPRETATION:   using PAP > 4 hours/night 93%  Estimated SUSANA 2 9/hour at @ 90th percentile  pressure of 9 5cm H2O   Compliance: excellent; Sleep disordered breathing:stable    SUBJECTIVE: Regarding use of PAP, Tracee reports:   · She is experiencing no  adverse effects: dry mouth/throat and mask leaks   · She is benefiting from use: sleeping better   Sleep Routine: She reports getting 6-7 hrs sleep  ; she no difficulty initiating, but reports diffculty maintaining sleep   She has interruptions of sleep and struggles to fall back asleep because of racing thoughts for which she uses Xanax p r n  Carlyon Burows She awakens spontaneously and feels refreshed  Aartisi Snellen denies Excessive Daytime Sleepiness  and rated herself at Total score: 5 /24 on the Horton Sleepiness Scale  Habits: reports that she has never smoked  She has never used smokeless tobacco ,  reports current alcohol use ,  reports no history of drug use , Caffeine use: excessive , Exercise routine: none is physically active in his job        EXAM: BP 90/60 (BP Location: Left arm, Cuff Size: Standard)   Pulse 79   Temp 97 8 °F (36 6 °C) (Temporal)   Ht 5' (1 524 m)   Wt 58 5 kg (129 lb)   SpO2 96%   BMI 25 19 kg/m²     Patient is well groomed; well appearing  Skin/Extrem: col & hydration normal; no edema  Psych: cooperativeand in no distress  Mental state:  Appears anxious  CNS: Alert, orientated, clear & coherent speech  H&N: EOMI; NC/AT:no facial pressure marks, no rashes  Physical findings otherwise essentially unchanged from previous  IMPRESSION: Problem List Items & Comorbidities Addressed this Visit    1  Obstructive sleep apnea  PAP DME Resupply/Reorder   2  Sleep disturbance     3  Dry mouth     4  Anxiety     5  Essential hypertension     6  Gastroesophageal reflux disease, unspecified whether esophagitis present         PLAN:  1  I reviewed results of prior studies and physiologic data with the patient  I discussed treatment options with risks and benefits  2  Treatment with  PAP is medically necessary and Owen Deshpande is agreable to continue use  3  Care of equipment, methods to improve comfort using PAP and importance of compliance with therapy were discussed  4  Pressure setting: continue 8-12 cmH2O     5  Rx provided to replace supplies and Care coordinated with DME provider  6  Cognitive behavioral therapy was initiated, Sleep Hygiene and behavioral techniques to manage Insomnia were discussed  7  She may benefit from an SSRI  She will follow up with PCP if anxiety persists       8  Follow-up is advised in 1 year or sooner if needed to monitor progress, compliance and to adjust therapy  Thank you for allowing me to participate in the care of this patient      Sincerely,    Authenticated electronically by Seamus Oconnor MD on 68/06/20   Board Certified Specialist

## 2021-02-25 NOTE — PATIENT INSTRUCTIONS

## 2021-02-26 ENCOUNTER — TELEPHONE (OUTPATIENT)
Dept: SLEEP CENTER | Facility: CLINIC | Age: 66
End: 2021-02-26

## 2021-03-01 ENCOUNTER — OFFICE VISIT (OUTPATIENT)
Dept: INTERNAL MEDICINE CLINIC | Facility: CLINIC | Age: 66
End: 2021-03-01
Payer: COMMERCIAL

## 2021-03-01 VITALS
WEIGHT: 125 LBS | OXYGEN SATURATION: 96 % | HEART RATE: 62 BPM | TEMPERATURE: 97.5 F | HEIGHT: 60 IN | SYSTOLIC BLOOD PRESSURE: 130 MMHG | DIASTOLIC BLOOD PRESSURE: 72 MMHG | BODY MASS INDEX: 24.54 KG/M2

## 2021-03-01 DIAGNOSIS — B02.9 HERPES ZOSTER WITHOUT COMPLICATION: Primary | ICD-10-CM

## 2021-03-01 DIAGNOSIS — E55.9 VITAMIN D DEFICIENCY: ICD-10-CM

## 2021-03-01 DIAGNOSIS — Z86.39 HX OF HYPERGLYCEMIA: ICD-10-CM

## 2021-03-01 DIAGNOSIS — E78.5 HYPERLIPIDEMIA, UNSPECIFIED HYPERLIPIDEMIA TYPE: ICD-10-CM

## 2021-03-01 DIAGNOSIS — I10 ESSENTIAL HYPERTENSION: ICD-10-CM

## 2021-03-01 DIAGNOSIS — F41.9 ANXIETY: ICD-10-CM

## 2021-03-01 PROCEDURE — 99214 OFFICE O/P EST MOD 30 MIN: CPT | Performed by: INTERNAL MEDICINE

## 2021-03-01 RX ORDER — ALPRAZOLAM 0.5 MG/1
0.5 TABLET ORAL DAILY
Qty: 90 TABLET | Refills: 0 | Status: SHIPPED | OUTPATIENT
Start: 2021-03-01 | End: 2021-10-05 | Stop reason: SDUPTHER

## 2021-03-01 RX ORDER — BUPROPION HYDROCHLORIDE 150 MG/1
150 TABLET ORAL EVERY MORNING
Qty: 90 TABLET | Refills: 3 | Status: SHIPPED | OUTPATIENT
Start: 2021-03-01 | End: 2022-04-05 | Stop reason: SDUPTHER

## 2021-03-01 RX ORDER — VALACYCLOVIR HYDROCHLORIDE 1 G/1
1000 TABLET, FILM COATED ORAL 2 TIMES DAILY
Qty: 14 TABLET | Refills: 0 | Status: SHIPPED | OUTPATIENT
Start: 2021-03-01 | End: 2021-03-09

## 2021-03-09 ENCOUNTER — LAB REQUISITION (OUTPATIENT)
Dept: LAB | Facility: HOSPITAL | Age: 66
End: 2021-03-09
Payer: COMMERCIAL

## 2021-03-09 ENCOUNTER — OFFICE VISIT (OUTPATIENT)
Dept: URGENT CARE | Facility: CLINIC | Age: 66
End: 2021-03-09
Payer: COMMERCIAL

## 2021-03-09 VITALS
DIASTOLIC BLOOD PRESSURE: 71 MMHG | RESPIRATION RATE: 18 BRPM | OXYGEN SATURATION: 97 % | BODY MASS INDEX: 24.54 KG/M2 | HEIGHT: 60 IN | WEIGHT: 125 LBS | TEMPERATURE: 97 F | SYSTOLIC BLOOD PRESSURE: 147 MMHG | HEART RATE: 72 BPM

## 2021-03-09 DIAGNOSIS — Z12.72 ENCOUNTER FOR SCREENING FOR MALIGNANT NEOPLASM OF VAGINA: ICD-10-CM

## 2021-03-09 DIAGNOSIS — B02.9 HERPES ZOSTER WITHOUT COMPLICATION: Primary | ICD-10-CM

## 2021-03-09 PROCEDURE — G0143 SCR C/V CYTO,THINLAYER,RESCR: HCPCS | Performed by: SPECIALIST

## 2021-03-09 PROCEDURE — G0382 LEV 3 HOSP TYPE B ED VISIT: HCPCS | Performed by: PHYSICIAN ASSISTANT

## 2021-03-09 RX ORDER — VALACYCLOVIR HYDROCHLORIDE 1 G/1
1000 TABLET, FILM COATED ORAL 3 TIMES DAILY
Qty: 30 TABLET | Refills: 0 | Status: SHIPPED | OUTPATIENT
Start: 2021-03-09 | End: 2021-03-19

## 2021-03-09 NOTE — PATIENT INSTRUCTIONS
Valtrex as directed  Informed patient that it can take 3-4 weeks for blisters to heal and scab over

## 2021-03-09 NOTE — PROGRESS NOTES
[facility]    NAME: Reji Carbajal is a 77 y o  female  : 1955    MRN: [id number]  DATE: 2021  TIME: 4:10 PM    Assessment and Plan   Herpes zoster without complication [R36 7]  1  Herpes zoster without complication  valACYclovir (VALTREX) 1,000 mg tablet       Patient Instructions     Patient Instructions   Valtrex as directed  Informed patient that it can take 3-4 weeks for blisters to heal and scab over  Chief Complaint     Chief Complaint   Patient presents with    Herpes Zoster       History of Present Illness     69-year-old female here with complaint of rash on her back present on both sides of her back at the same level  The have an erythematous base with a small fluid-filled blister in the middle  Patient states that resembles shingles which she had 2 months ago  Patient states her symptoms in January 2 months ago had started after getting her  COVID vaccination  She saw her PCP last week and was diagnosed again with shingles  She states the only difference this time is that it is on both sides not just 1  Her PCP agreed it is unusual for her to be on both sides but otherwise it resembles shingles  Denies any itching  Some mild discomfort but no real pain  Review of Systems   Review of Systems   Constitutional: Negative for appetite change, chills and fever  HENT: Negative for congestion, ear discharge, ear pain, facial swelling, postnasal drip, sinus pressure, sneezing and sore throat  Respiratory: Negative for cough, shortness of breath and wheezing  Skin: Positive for rash  Neurological: Negative for headaches         Current Medications     Current Outpatient Medications:     ALPRAZolam (XANAX) 0 5 mg tablet, Take 1 tablet (0 5 mg total) by mouth daily, Disp: 90 tablet, Rfl: 0    ascorbic acid (VITAMIN C) 500 mg tablet, Take 500 mg by mouth daily, Disp: , Rfl:     atenolol (TENORMIN) 50 mg tablet, Take 1 tablet (50 mg total) by mouth daily, Disp: 90 tablet, Rfl: 3    buPROPion (WELLBUTRIN XL) 150 mg 24 hr tablet, Take 1 tablet (150 mg total) by mouth every morning, Disp: 90 tablet, Rfl: 3    Cholecalciferol (VITAMIN D3) 2000 units capsule, Take 5,000 Units by mouth , Disp: , Rfl:     lisinopril (ZESTRIL) 10 mg tablet, Take 1 tablet (10 mg total) by mouth daily, Disp: 90 tablet, Rfl: 3    valACYclovir (VALTREX) 1,000 mg tablet, Take 1 tablet (1,000 mg total) by mouth 3 (three) times a day for 10 days (Patient not taking: Reported on 3/1/2021), Disp: 30 tablet, Rfl: 0    valACYclovir (VALTREX) 1,000 mg tablet, Take 1 tablet (1,000 mg total) by mouth 3 (three) times a day for 10 days, Disp: 30 tablet, Rfl: 0    Current Allergies     Allergies as of 2021    (No Known Allergies)          The following portions of the patient's history were reviewed and updated as appropriate: allergies, current medications, past family history, past medical history, past social history, past surgical history and problem list    Past Medical History:   Diagnosis Date    CPAP (continuous positive airway pressure) dependence     DJD (degenerative joint disease) of cervical spine     Hyperglycemia     last assessed 16    Hypertension     Medullary sponge kidney     Sleep apnea      Past Surgical History:   Procedure Laterality Date    CATARACT EXTRACTION Left 2011     SECTION      unknown onset    CYSTOSCOPY W/ URETERAL STENT PLACEMENT Right     CYSTOSCOPY W/ URETERAL STENT REMOVAL Right 10/27/2015    right stent    DILATION AND CURETTAGE OF UTERUS      of cervical stump unknown onset    OOPHORECTOMY Left     unknown onset    NH REPAIR UMBILICAL TLZE,7+X/R,FCAUV N/A 10/23/2018    Procedure: REPAIR HERNIA UMBILICAL;  Surgeon: Aida Rowe DO;  Location: [facility];  Service: General    TOTAL ABDOMINAL HYSTERECTOMY      w/ removal of both ovaries, last assessed 14     Family History   Problem Relation Age of Onset    Coronary artery disease Mother     Hypertension Mother     Stroke Mother         syndrome    Diabetes Family     Pancreatic cancer Father     No Known Problems Daughter     No Known Problems Maternal Grandmother     No Known Problems Maternal Grandfather     Endometrial cancer Paternal Grandmother     Leukemia Paternal Grandfather     Lung cancer Paternal Aunt     Lung cancer Paternal Uncle      Social History     Socioeconomic History    Marital status: /Civil Union     Spouse name: Not on file    Number of children: Not on file    Years of education: Not on file    Highest education level: Not on file   Occupational History    Not on file   Social Needs    Financial resource strain: Not on file    Food insecurity     Worry: Not on file     Inability: Not on file   [employer] needs     Medical: Not on file     Non-medical: Not on file   Tobacco Use    Smoking status: Never Smoker    Smokeless tobacco: Never Used   Substance and Sexual Activity    Alcohol use: Yes     Comment: 3x per week wine or beer    Drug use: No    Sexual activity: Not on file   Lifestyle    Physical activity     Days per week: Not on file     Minutes per session: Not on file    Stress: Not on file   Relationships    Social connections     Talks on phone: Not on file     Gets together: Not on file     Attends Adventism service: Not on file     Active member of club or organization: Not on file     Attends meetings of clubs or organizations: Not on file     Relationship status: Not on file    Intimate partner violence     Fear of current or ex partner: Not on file     Emotionally abused: Not on file     Physically abused: Not on file     Forced sexual activity: Not on file   Other Topics Concern    Not on file   Social History Narrative    Dental care occasionally    Lives with spouse    No advance directives    No caffeine use    Sun screen worn    Uses seatbelt     Medications have been verified      Objective   BP 147/71   Pulse 72   Temp (!) 97 °F (36 1 °C)   Resp 18   Ht 5' (1 524 m)   Wt 56 7 kg (125 lb)   SpO2 97%   BMI 24 41 kg/m²      Physical Exam   Physical Exam  Vitals signs and nursing note reviewed  Constitutional:       General: She is not in acute distress  Appearance: She is well-developed  HENT:      Head: Normocephalic and atraumatic  Right Ear: Tympanic membrane normal       Left Ear: Tympanic membrane normal       Nose: Nose normal  No mucosal edema or rhinorrhea  Right Sinus: No maxillary sinus tenderness or frontal sinus tenderness  Left Sinus: No maxillary sinus tenderness or frontal sinus tenderness  Mouth/Throat:      Pharynx: No oropharyngeal exudate or posterior oropharyngeal erythema  Eyes:      Conjunctiva/sclera: Conjunctivae normal    Cardiovascular:      Rate and Rhythm: Normal rate and regular rhythm  Heart sounds: Normal heart sounds  No murmur  Pulmonary:      Effort: Pulmonary effort is normal       Breath sounds: Normal breath sounds  Skin:     Findings: Rash (Rash bilateral lower back with erythematous base a slight blister in the center  Only see 4 circular type lesions ) present

## 2021-03-13 LAB
LAB AP GYN PRIMARY INTERPRETATION: NORMAL
Lab: NORMAL

## 2021-04-21 ENCOUNTER — APPOINTMENT (OUTPATIENT)
Dept: LAB | Facility: HOSPITAL | Age: 66
End: 2021-04-21
Payer: COMMERCIAL

## 2021-04-21 ENCOUNTER — TRANSCRIBE ORDERS (OUTPATIENT)
Dept: ADMINISTRATIVE | Facility: HOSPITAL | Age: 66
End: 2021-04-21

## 2021-04-21 DIAGNOSIS — Z00.8 HEALTH EXAMINATION IN POPULATION SURVEY: ICD-10-CM

## 2021-04-21 DIAGNOSIS — Z00.8 HEALTH EXAMINATION IN POPULATION SURVEY: Primary | ICD-10-CM

## 2021-04-21 LAB
CHOLEST SERPL-MCNC: 200 MG/DL (ref 50–200)
EST. AVERAGE GLUCOSE BLD GHB EST-MCNC: 114 MG/DL
HBA1C MFR BLD: 5.6 %
HDLC SERPL-MCNC: 62 MG/DL
LDLC SERPL CALC-MCNC: 116 MG/DL (ref 0–100)
NONHDLC SERPL-MCNC: 138 MG/DL
TRIGL SERPL-MCNC: 108 MG/DL

## 2021-04-21 PROCEDURE — 80061 LIPID PANEL: CPT

## 2021-04-21 PROCEDURE — 36415 COLL VENOUS BLD VENIPUNCTURE: CPT

## 2021-04-21 PROCEDURE — 83036 HEMOGLOBIN GLYCOSYLATED A1C: CPT

## 2021-04-23 ENCOUNTER — TELEPHONE (OUTPATIENT)
Dept: INTERNAL MEDICINE CLINIC | Facility: CLINIC | Age: 66
End: 2021-04-23

## 2021-04-23 NOTE — TELEPHONE ENCOUNTER
Pt called asking for proof about shingles to be sent to her manager? Im not sure what exactly she means by that as I don't see she had a vaccine so I lmom for pt to call me back to let me know along with a fax number

## 2021-04-23 NOTE — TELEPHONE ENCOUNTER
Can we send a letter that treatment was completed for shingles?  Needs for work or can not return to work Monday

## 2021-06-15 ENCOUNTER — OFFICE VISIT (OUTPATIENT)
Dept: INTERNAL MEDICINE CLINIC | Facility: CLINIC | Age: 66
End: 2021-06-15
Payer: COMMERCIAL

## 2021-06-15 VITALS
HEART RATE: 72 BPM | SYSTOLIC BLOOD PRESSURE: 128 MMHG | DIASTOLIC BLOOD PRESSURE: 72 MMHG | TEMPERATURE: 97.3 F | BODY MASS INDEX: 25.91 KG/M2 | OXYGEN SATURATION: 95 % | WEIGHT: 132 LBS | HEIGHT: 60 IN
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
## 2021-06-15 DIAGNOSIS — M54.50 ACUTE LEFT-SIDED LOW BACK PAIN WITHOUT SCIATICA: ICD-10-CM
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
## 2021-06-15 PROCEDURE — 99214 OFFICE O/P EST MOD 30 MIN: CPT | Performed by: INTERNAL MEDICINE

## 2021-06-15 NOTE — PROGRESS NOTES
BMI Counseling: Body mass index is 25 78 kg/m²  The BMI is above normal  Nutrition recommendations include decreasing fast food intake and moderation in carbohydrate intake  Exercise recommendations include exercising 3-5 times per week  No pharmacotherapy was ordered  Falls Plan of Care: balance, strength, and gait training instructions were provided  Assessment/Plan:      Diagnoses and all orders for this visit:    Fall, sequela    Acute left-sided low back pain without sciatica  -     XR spine lumbar 2 or 3 views injury; Future  Check xray Sxs seem to be improving but hx of arthritis       Has October appt          Patient ID: Juan Luis Erickson is a 77 y o  female  HPI   Pt feel/slipped in the OR and struck her back on the floor She had left sided pain after fall which has resolved since the fall She denies redness No hematuria No abdominal pain No sob or chest pain No leg sxs     Review of Systems   Constitutional: Negative for activity change, chills and fever  Respiratory: Negative for cough and shortness of breath  Cardiovascular: Negative for chest pain, palpitations and leg swelling  Gastrointestinal: Negative for abdominal distention and abdominal pain  Genitourinary: Negative for difficulty urinating, dysuria and flank pain  Musculoskeletal: Positive for arthralgias and gait problem  Skin: Negative for rash  Neurological: Negative for dizziness, light-headedness and headaches  Psychiatric/Behavioral: Negative for sleep disturbance  The patient is not nervous/anxious          Past Medical History:   Diagnosis Date    CPAP (continuous positive airway pressure) dependence     DJD (degenerative joint disease) of cervical spine     Hyperglycemia     last assessed 16    Hypertension     Medullary sponge kidney     Sleep apnea      Past Surgical History:   Procedure Laterality Date    CATARACT EXTRACTION Left 2011     SECTION      unknown onset    CYSTOSCOPY W/ URETERAL STENT PLACEMENT Right     CYSTOSCOPY W/ URETERAL STENT REMOVAL Right 10/27/2015    right stent    DILATION AND CURETTAGE OF UTERUS      of cervical stump unknown onset    OOPHORECTOMY Left     unknown onset    NH REPAIR UMBILICAL ZKTT,4+Q/X,YZZCO N/A 10/23/2018    Procedure: REPAIR HERNIA UMBILICAL;  Surgeon: Ajay Hurd DO;  Location: [facility];  Service: General    TOTAL ABDOMINAL HYSTERECTOMY      w/ removal of both ovaries, last assessed 8/21/14     Social History     Socioeconomic History    Marital status: /Civil Union     Spouse name: Not on file    Number of children: Not on file    Years of education: Not on file    Highest education level: Not on file   Occupational History    Not on file   Tobacco Use    Smoking status: Never Smoker    Smokeless tobacco: Never Used   Vaping Use    Vaping Use: Never used   Substance and Sexual Activity    Alcohol use: Yes     Comment: 3x per week wine or beer    Drug use: No    Sexual activity: Not on file   Other Topics Concern    Not on file   Social History Narrative    Dental care occasionally    Lives with spouse    No advance directives    No caffeine use    Sun screen worn    Uses seatbelt     Social Determinants of Health     Financial Resource Strain:     Difficulty of Paying Living Expenses:    Food Insecurity:     Worried About Running Out of Food in the Last Year:     Ran Out of Food in the Last Year:    Transportation Needs:     Lack of Transportation (Medical):      Lack of Transportation (Non-Medical):    Physical Activity:     Days of Exercise per Week:     Minutes of Exercise per Session:    Stress:     Feeling of Stress :    Social Connections:     Frequency of Communication with Friends and Family:     Frequency of Social Gatherings with Friends and Family:     Attends Hinduism Services:     Active Member of Clubs or Organizations:     Attends Club or Organization Meetings:     Marital Status: Intimate Partner Violence:     Fear of Current or Ex-Partner:     Emotionally Abused:     Physically Abused:     Sexually Abused:      No Known Allergies  Vitals:    06/15/21 1551 06/15/21 1608   BP:  128/72   Pulse: 72    Temp: (!) 97 3 °F (36 3 °C)    TempSrc: Temporal    SpO2: 95%    Weight: 59 9 kg (132 lb)    Height: 5' (1 524 m)         Physical Exam  Vitals reviewed  Constitutional:       General: She is not in acute distress  Appearance: Normal appearance  She is not ill-appearing, toxic-appearing or diaphoretic  HENT:      Head: Normocephalic and atraumatic  Cardiovascular:      Rate and Rhythm: Normal rate and regular rhythm  Pulses: Normal pulses  Heart sounds: Normal heart sounds  No murmur heard  Pulmonary:      Effort: Pulmonary effort is normal  No respiratory distress  Breath sounds: Normal breath sounds  No wheezing  Abdominal:      General: Bowel sounds are normal  There is no distension  Palpations: Abdomen is soft  Tenderness: There is no abdominal tenderness  Musculoskeletal:      Cervical back: Normal range of motion and neck supple  No rigidity  Lymphadenopathy:      Cervical: No cervical adenopathy  Skin:     General: Skin is dry  Coloration: Skin is not jaundiced or pale  Neurological:      General: No focal deficit present  Mental Status: She is alert and oriented to person, place, and time  Mental status is at baseline  Cranial Nerves: No cranial nerve deficit  Sensory: No sensory deficit  Motor: No weakness  Coordination: Coordination normal       Gait: Gait normal       Deep Tendon Reflexes: Reflexes normal    Psychiatric:         Mood and Affect: Mood normal          Behavior: Behavior normal          Thought Content:  Thought content normal          Judgment: Judgment normal

## 2021-07-29 DIAGNOSIS — I10 HYPERTENSION, UNSPECIFIED TYPE: ICD-10-CM

## 2021-07-29 RX ORDER — ATENOLOL 50 MG/1
50 TABLET ORAL DAILY
Qty: 90 TABLET | Refills: 3 | Status: SHIPPED | OUTPATIENT
Start: 2021-07-29 | End: 2022-05-04 | Stop reason: SDUPTHER

## 2021-07-29 RX ORDER — LISINOPRIL 10 MG/1
10 TABLET ORAL DAILY
Qty: 90 TABLET | Refills: 3 | Status: SHIPPED | OUTPATIENT
Start: 2021-07-29 | End: 2022-04-05 | Stop reason: SDUPTHER

## 2021-07-30 ENCOUNTER — TELEPHONE (OUTPATIENT)
Dept: SLEEP CENTER | Facility: CLINIC | Age: 66
End: 2021-07-30

## 2021-07-30 NOTE — TELEPHONE ENCOUNTER
Returned the patients call about PAP recall  Left call back message for the patient   Need mask type for the RX

## 2021-08-03 NOTE — TELEPHONE ENCOUNTER
Will you write RX for replacement of Misa machine   Patient wears nasal mask    Seen on 2/25/2021    Patient asking that RX be sent to [facility]

## 2021-08-08 ENCOUNTER — OFFICE VISIT (OUTPATIENT)
Dept: URGENT CARE | Facility: CLINIC | Age: 66
End: 2021-08-08
Payer: COMMERCIAL

## 2021-08-08 VITALS
DIASTOLIC BLOOD PRESSURE: 94 MMHG | TEMPERATURE: 97 F | SYSTOLIC BLOOD PRESSURE: 130 MMHG | WEIGHT: 125 LBS | RESPIRATION RATE: 18 BRPM | BODY MASS INDEX: 24.54 KG/M2 | OXYGEN SATURATION: 99 % | HEIGHT: 60 IN | HEART RATE: 61 BPM

## 2021-08-08 DIAGNOSIS — M79.671 RIGHT FOOT PAIN: ICD-10-CM
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
## 2021-08-08 DIAGNOSIS — S90.861A INSECT BITE OF RIGHT FOOT, INITIAL ENCOUNTER: Primary | ICD-10-CM

## 2021-08-08 DIAGNOSIS — R22.41 LOCALIZED SWELLING OF RIGHT FOOT: ICD-10-CM

## 2021-08-08 PROCEDURE — G0383 LEV 4 HOSP TYPE B ED VISIT: HCPCS | Performed by: NURSE PRACTITIONER

## 2021-08-08 PROCEDURE — 96372 THER/PROPH/DIAG INJ SC/IM: CPT | Performed by: NURSE PRACTITIONER

## 2021-08-08 RX ORDER — METHYLPREDNISOLONE SODIUM SUCCINATE 40 MG/ML
40 INJECTION, POWDER, LYOPHILIZED, FOR SOLUTION INTRAMUSCULAR; INTRAVENOUS ONCE
Status: DISCONTINUED | OUTPATIENT
Start: 2021-08-08 | End: 2021-08-08

## 2021-08-08 RX ORDER — FAMOTIDINE 10 MG
10 TABLET ORAL 2 TIMES DAILY
Qty: 10 TABLET | Refills: 0 | Status: SHIPPED | OUTPATIENT
Start: 2021-08-09 | End: 2022-04-05 | Stop reason: ALTCHOICE

## 2021-08-08 RX ORDER — PREDNISONE 10 MG/1
TABLET ORAL
Qty: 24 TABLET | Refills: 0 | Status: SHIPPED | OUTPATIENT
Start: 2021-08-08 | End: 2022-04-05 | Stop reason: ALTCHOICE

## 2021-08-08 RX ORDER — METHYLPREDNISOLONE SODIUM SUCCINATE 40 MG/ML
40 INJECTION, POWDER, LYOPHILIZED, FOR SOLUTION INTRAMUSCULAR; INTRAVENOUS ONCE
Status: COMPLETED | OUTPATIENT
Start: 2021-08-08 | End: 2021-08-08

## 2021-08-08 RX ADMIN — METHYLPREDNISOLONE SODIUM SUCCINATE 40 MG: 40 INJECTION, POWDER, LYOPHILIZED, FOR SOLUTION INTRAMUSCULAR; INTRAVENOUS at 14:33

## 2021-08-08 NOTE — PROGRESS NOTES
[facility]        NAME: Sylvia Mkceon is a 77 y o  female  : 1955    MRN: [id number]  DATE: 2021  TIME: 2:53 PM    Assessment and Plan   Insect bite of right foot, initial encounter [facility]  XXXA]  1  Insect bite of right foot, initial encounter  famotidine (PEPCID) 10 mg tablet    predniSONE 10 mg tablet    methylPREDNISolone sodium succinate (Solu-MEDROL) injection 40 mg    DISCONTINUED: methylPREDNISolone sodium succinate (Solu-MEDROL) injection 40 mg   2  Localized swelling of right foot  famotidine (PEPCID) 10 mg tablet    predniSONE 10 mg tablet    methylPREDNISolone sodium succinate (Solu-MEDROL) injection 40 mg    DISCONTINUED: methylPREDNISolone sodium succinate (Solu-MEDROL) injection 40 mg   3  Right foot pain  famotidine (PEPCID) 10 mg tablet    predniSONE 10 mg tablet    methylPREDNISolone sodium succinate (Solu-MEDROL) injection 40 mg    DISCONTINUED: methylPREDNISolone sodium succinate (Solu-MEDROL) injection 40 mg         Patient Instructions       Follow up with PCP in 3-5 days  Proceed to  ER if symptoms worsen  You have been given a shot of steroid in the office  You may start the prednisone  if the swelling remains  You are to take the pepcid to prevent swelling and itching  You are to apply benadryl cream, spray or baking soda with water and vinegar mix  You are to apply ice   Follow up with your PCP  Go to the ED if symptoms worsen  Take tylenol or motrin for pain  Do not take motrin if you are taking the prednisone               Chief Complaint     Chief Complaint   Patient presents with    Insect Bite     Patient c/o right side of foot pain that started this morning that she thinks she may have stepped on a wasp  History of Present Illness       This is a 77year old female who states she thinks she stepped on a bee at 6 am this morning  She states her foot is becoming painful and swollen     She states she thinks the stinger is still in the foot that she saw something black  Review of Systems   Review of Systems   Constitutional: Negative  HENT: Negative  Eyes: Negative  Respiratory: Negative  Cardiovascular: Negative  Gastrointestinal: Negative  Endocrine: Negative  Genitourinary: Negative  Musculoskeletal: Negative  Skin: Positive for wound  Allergic/Immunologic: Negative  Neurological: Negative  Hematological: Negative  Psychiatric/Behavioral: Negative  Current Medications       Current Outpatient Medications:     ALPRAZolam (XANAX) 0 5 mg tablet, Take 1 tablet (0 5 mg total) by mouth daily, Disp: 90 tablet, Rfl: 0    ascorbic acid (VITAMIN C) 500 mg tablet, Take 500 mg by mouth daily, Disp: , Rfl:     atenolol (TENORMIN) 50 mg tablet, Take 1 tablet (50 mg total) by mouth daily, Disp: 90 tablet, Rfl: 3    buPROPion (WELLBUTRIN XL) 150 mg 24 hr tablet, Take 1 tablet (150 mg total) by mouth every morning, Disp: 90 tablet, Rfl: 3    Cholecalciferol (VITAMIN D3) 2000 units capsule, Take 5,000 Units by mouth , Disp: , Rfl:     lisinopril (ZESTRIL) 10 mg tablet, Take 1 tablet (10 mg total) by mouth daily, Disp: 90 tablet, Rfl: 3    [START ON 8/9/2021] famotidine (PEPCID) 10 mg tablet, Take 1 tablet (10 mg total) by mouth 2 (two) times a day for 5 days, Disp: 10 tablet, Rfl: 0    predniSONE 10 mg tablet, Take 5 tabs po x 2 days; 4 tabs po x 2 days; 3 tabs po x 1 day; 2 tabs po x 1 day  1 tab po x 1 day , Disp: 24 tablet, Rfl: 0    valACYclovir (VALTREX) 1,000 mg tablet, Take 1 tablet (1,000 mg total) by mouth 3 (three) times a day for 10 days (Patient not taking: Reported on 3/1/2021), Disp: 30 tablet, Rfl: 0    valACYclovir (VALTREX) 1,000 mg tablet, Take 1 tablet (1,000 mg total) by mouth 3 (three) times a day for 10 days (Patient not taking: Reported on 6/15/2021), Disp: 30 tablet, Rfl: 0  No current facility-administered medications for this visit      Current Allergies     Allergies as of 2021    (No Known Allergies)            The following portions of the patient's history were reviewed and updated as appropriate: allergies, current medications, past family history, past medical history, past social history, past surgical history and problem list      Past Medical History:   Diagnosis Date    CPAP (continuous positive airway pressure) dependence     DJD (degenerative joint disease) of cervical spine     Hyperglycemia     last assessed 16    Hypertension     Medullary sponge kidney     Sleep apnea        Past Surgical History:   Procedure Laterality Date    CATARACT EXTRACTION Left 2011     SECTION      unknown onset    CYSTOSCOPY W/ URETERAL STENT PLACEMENT Right     CYSTOSCOPY W/ URETERAL STENT REMOVAL Right 10/27/2015    right stent    DILATION AND CURETTAGE OF UTERUS      of cervical stump unknown onset    OOPHORECTOMY Left     unknown onset    MA REPAIR UMBILICAL XGDT,1+O/C,YKPSI N/A 10/23/2018    Procedure: REPAIR HERNIA UMBILICAL;  Surgeon: Reyes Files, DO;  Location: [facility];  Service: General    TOTAL ABDOMINAL HYSTERECTOMY      w/ removal of both ovaries, last assessed 14       Family History   Problem Relation Age of Onset    Coronary artery disease Mother     Hypertension Mother     Stroke Mother         syndrome    Diabetes Family     Pancreatic cancer Father     No Known Problems Daughter     No Known Problems Maternal Grandmother     No Known Problems Maternal Grandfather     Endometrial cancer Paternal Grandmother     Leukemia Paternal Grandfather     Lung cancer Paternal Aunt     Lung cancer Paternal Uncle          Medications have been verified  Objective   /94   Pulse 61   Temp (!) 97 °F (36 1 °C) (Temporal)   Resp 18   Ht 5' (1 524 m)   Wt 56 7 kg (125 lb)   SpO2 99%   BMI 24 41 kg/m²   No LMP recorded   Patient is postmenopausal        Physical Exam     Physical Exam  Vitals and nursing note reviewed  Constitutional:       General: She is not in acute distress  Appearance: Normal appearance  She is normal weight  She is not ill-appearing, toxic-appearing or diaphoretic  HENT:      Head: Normocephalic and atraumatic  Mouth/Throat:      Mouth: Mucous membranes are moist       Pharynx: No posterior oropharyngeal erythema  Eyes:      Extraocular Movements: Extraocular movements intact  Cardiovascular:      Rate and Rhythm: Normal rate  Pulses: Normal pulses  Pulmonary:      Effort: Pulmonary effort is normal    Musculoskeletal:         General: Normal range of motion  Cervical back: Normal range of motion  Feet:    Skin:     General: Skin is warm and dry  Capillary Refill: Capillary refill takes less than 2 seconds  Findings: Erythema present  Neurological:      General: No focal deficit present  Mental Status: She is alert and oriented to person, place, and time  Psychiatric:         Mood and Affect: Mood normal          Behavior: Behavior normal          Thought Content:  Thought content normal          Judgment: Judgment normal

## 2021-08-08 NOTE — LETTER
August 8, 2021     Patient: Mary Mccollum   YOB: 1955   Date of Visit: 8/8/2021       To Whom It May Concern: It is my medical opinion that Mary Mccollum may return to work on 8/10/2021  If you have any questions or concerns, please don't hesitate to call           Sincerely,        RADHA Salamanca    CC: No Recipients

## 2021-08-08 NOTE — PATIENT INSTRUCTIONS
You have been given a shot of steroid in the office  You may start the prednisone 8/9 if the swelling remains  You are to take the pepcid to prevent swelling and itching  You are to apply benadryl cream, spray or baking soda with water and vinegar mix  You are to apply ice   Follow up with your PCP  Go to the ED if symptoms worsen  Take tylenol or motrin for pain  Do not take motrin if you are taking the prednisone       Insect Bite or Sting   WHAT YOU NEED TO KNOW:   Most insect bites and stings are not dangerous and go away without treatment  Your symptoms may be mild, or you may develop anaphylaxis  Anaphylaxis is a sudden, life-threatening reaction that needs immediate treatment  Common examples of insects that bite or sting are bees, ticks, mosquitoes, spiders, and ants  Insect bites or stings can lead to diseases such as malaria, West Nile virus, Lyme disease, or [place] Spotted Fever  DISCHARGE INSTRUCTIONS:   Call your local emergency number (911 in the [address] [facility]) for signs or symptoms of anaphylaxis,  such as trouble breathing, swelling in your mouth or throat, or wheezing  You may also have itching, a rash, hives, or feel like you are going to faint  Return to the emergency department if:   · You are stung on your tongue or in your throat  · A white area forms around the bite  · You are sweating badly or have body pain  · You think you were bitten or stung by a poisonous insect  Call your doctor if:   · You have a fever  · The area becomes red, warm, tender, and swollen beyond the area of the bite or sting  · You have questions or concerns about your condition or care  Medicines: You may need any of the following:  · Antihistamines  decrease itching and rash  · Epinephrine  is used to treat severe allergic reactions such as anaphylaxis  · Take your medicine as directed  Contact your healthcare provider if you think your medicine is not helping or if you have side effects  Tell him of her if you are allergic to any medicine  Keep a list of the medicines, vitamins, and herbs you take  Include the amounts, and when and why you take them  Bring the list or the pill bottles to follow-up visits  Carry your medicine list with you in case of an emergency  Steps to take for signs or symptoms of anaphylaxis:   · Immediately  give 1 shot of epinephrine only into the outer thigh muscle  · Leave the shot in place  as directed  Your healthcare provider may recommend you leave it in place for up to 10 seconds before you remove it  This helps make sure all of the epinephrine is delivered  · Call 911 and go to the emergency department,  even if the shot improved symptoms  Do not drive yourself  Bring the used epinephrine shot with you  Safety precautions to take if you are at risk for anaphylaxis:   · Keep 2 shots of epinephrine with you at all times  You may need a second shot, because epinephrine only works for about 20 minutes and symptoms may return  Your healthcare provider can show you and family members how to give the shot  Check the expiration date every month and replace it before it expires  · Create an action plan  Your healthcare provider can help you create a written plan that explains the allergy and an emergency plan to treat a reaction  The plan explains when to give a second epinephrine shot if symptoms return or do not improve after the first  Give copies of the action plan and emergency instructions to family members, work and school staff, and  providers  Show them how to give a shot of epinephrine  · Carry medical alert identification  Wear medical alert jewelry or carry a card that says you have an insect allergy  Ask your healthcare provider where to get these items  If an insect bites or stings you:   · Remove the stinger  Scrape the stinger out with your fingernail, edge of a credit card, or a knife blade  Do not squeeze the wound  Gently wash the area with soap and water  · Remove the tick  Ticks must be removed as soon as possible so you do not get diseases passed through tick bites  Ask your healthcare provider for more information on tick bites and how to remove ticks  Care for a bite or sting wound:   · Elevate (raise) the area above the level of your heart, if possible  Prop the area on pillows to keep it raised comfortably  Elevate the area for 10 to 20 minutes each hour or as directed by your healthcare provider  · Use compresses  Soak a clean washcloth in cold water, wring it out, and put it on the bite or sting  Use the compress for 10 to 20 minutes each hour or as directed by your healthcare provider  After 24 to 48 hours, change to warm compresses  · Apply a paste  Add water to baking soda to make a thick paste  Put the paste on the area for 5 minutes  Rinse gently to remove the paste  Prevent another insect bite or sting:   · Do not wear bright-colored or flower-print clothing when you plan to spend time outdoors  Do not use hairspray, perfumes, or aftershave  · Do not leave food out  · Empty any standing water and wash container with soap and water every 2 days  · Put screens on all open windows and doors  · Put insect repellent that contains DEET on skin that is showing when you go outside  Put insect repellent at the top of your boots, bottom of pant legs, and sleeve cuffs  Wear long sleeves, pants, and shoes  · Use citronella candles outdoors to help keep mosquitoes away  Put a tick and flea collar on pets  Follow up with your doctor as directed:  Write down your questions so you remember to ask them during your visits  © Copyright [employer] 2021 Information is for End User's use only and may not be sold, redistributed or otherwise used for commercial purposes   All illustrations and images included in CareNotes® are the copyrighted property of [employer] M [employer]  or [employer] [facility]  The above information is an  only  It is not intended as medical advice for individual conditions or treatments  Talk to your doctor, nurse or pharmacist before following any medical regimen to see if it is safe and effective for you

## 2021-08-09 ENCOUNTER — TELEPHONE (OUTPATIENT)
Dept: INTERNAL MEDICINE CLINIC | Facility: CLINIC | Age: 66
End: 2021-08-09

## 2021-08-09 ENCOUNTER — OFFICE VISIT (OUTPATIENT)
Dept: INTERNAL MEDICINE CLINIC | Facility: CLINIC | Age: 66
End: 2021-08-09
Payer: COMMERCIAL

## 2021-08-09 VITALS
SYSTOLIC BLOOD PRESSURE: 134 MMHG | BODY MASS INDEX: 24.94 KG/M2 | WEIGHT: 127 LBS | OXYGEN SATURATION: 96 % | HEART RATE: 80 BPM | TEMPERATURE: 97.7 F | DIASTOLIC BLOOD PRESSURE: 72 MMHG | HEIGHT: 60 IN

## 2021-08-09 DIAGNOSIS — T63.441S BEE STING REACTION, ACCIDENTAL OR UNINTENTIONAL, SEQUELA: Primary | ICD-10-CM

## 2021-08-09 PROBLEM — T63.441A BEE STING REACTION: Status: ACTIVE | Noted: 2021-08-09

## 2021-08-09 PROCEDURE — 99213 OFFICE O/P EST LOW 20 MIN: CPT | Performed by: INTERNAL MEDICINE

## 2021-08-09 NOTE — PROGRESS NOTES
Assessment/Plan:      Diagnoses and all orders for this visit:    Bee sting reaction, accidental or unintentional, sequela  Prednisone taper and pepcid,calritin   RTW tomorrow     Return as scheduled         Patient ID: Dwayne Valleoj is a 77 y o  female  HPI   Pt was sting by bee on right foot and had swelling and redness   Had steroid injection and sxs are improving She did not start prednisone  Itching today No wheeze no fever or chills No hx of allergic reaction     Review of Systems   Constitutional: Negative for activity change, chills, fatigue and fever  Respiratory: Negative for cough, shortness of breath and wheezing  Cardiovascular: Negative for chest pain, palpitations and leg swelling  Neurological: Negative for dizziness, light-headedness and headaches  Psychiatric/Behavioral: Negative for sleep disturbance  The patient is not nervous/anxious          Past Medical History:   Diagnosis Date    CPAP (continuous positive airway pressure) dependence     DJD (degenerative joint disease) of cervical spine     Hyperglycemia     last assessed 16    Hypertension     Medullary sponge kidney     Sleep apnea      Past Surgical History:   Procedure Laterality Date    CATARACT EXTRACTION Left 2011     SECTION      unknown onset    CYSTOSCOPY W/ URETERAL STENT PLACEMENT Right     CYSTOSCOPY W/ URETERAL STENT REMOVAL Right 10/27/2015    right stent    DILATION AND CURETTAGE OF UTERUS      of cervical stump unknown onset    OOPHORECTOMY Left     unknown onset    LA REPAIR UMBILICAL JDKY,2+K/H,WCOUC N/A 10/23/2018    Procedure: REPAIR HERNIA UMBILICAL;  Surgeon: Dina Muniz DO;  Location: [facility];  Service: General    TOTAL ABDOMINAL HYSTERECTOMY      w/ removal of both ovaries, last assessed 14     Social History     Socioeconomic History    Marital status: /Civil Union     Spouse name: Not on file    Number of children: Not on file    Years of education: Not on file    Highest education level: Not on file   Occupational History    Not on file   Tobacco Use    Smoking status: Never Smoker    Smokeless tobacco: Never Used   Vaping Use    Vaping Use: Never used   Substance and Sexual Activity    Alcohol use: Yes     Comment: 3x per week wine or beer    Drug use: No    Sexual activity: Not on file   Other Topics Concern    Not on file   Social History Narrative    Dental care occasionally    Lives with spouse    No advance directives    No caffeine use    Sun screen worn    Uses seatbelt     Social Determinants of Health     Financial Resource Strain:     Difficulty of Paying Living Expenses:    Food Insecurity:     Worried About Running Out of Food in the Last Year:     Ran Out of Food in the Last Year:    Transportation Needs:     Lack of Transportation (Medical):  Lack of Transportation (Non-Medical):    Physical Activity:     Days of Exercise per Week:     Minutes of Exercise per Session:    Stress:     Feeling of Stress :    Social Connections:     Frequency of Communication with Friends and Family:     Frequency of Social Gatherings with Friends and Family:     Attends Faith Services:     Active Member of Clubs or Organizations:     Attends Club or Organization Meetings:     Marital Status:    Intimate Partner Violence:     Fear of Current or Ex-Partner:     Emotionally Abused:     Physically Abused:     Sexually Abused:      No Known Allergies       Visit Vitals  /72   Temp 97 7 °F (36 5 °C) (Temporal)   Ht 5' (1 524 m)   Wt 57 6 kg (127 lb)   BMI 24 80 kg/m²   OB Status Postmenopausal   Smoking Status Never Smoker   BSA 1 54 m²        Physical Exam  Vitals reviewed  Constitutional:       General: She is not in acute distress  Appearance: Normal appearance  She is not toxic-appearing  HENT:      Head: Normocephalic and atraumatic  Eyes:      General: No scleral icterus    Cardiovascular:      Rate and Rhythm: Normal rate and regular rhythm  Pulses: Normal pulses  Heart sounds: Normal heart sounds  No murmur heard  Pulmonary:      Effort: Pulmonary effort is normal  No respiratory distress  Breath sounds: Normal breath sounds  No wheezing  Musculoskeletal:      Right lower leg: Edema present  Lymphadenopathy:      Cervical: No cervical adenopathy  Skin:     General: Skin is dry  Findings: Erythema present  Comments: Resolving redness right foot bite tk    Neurological:      General: No focal deficit present  Mental Status: She is alert and oriented to person, place, and time  Mental status is at baseline  Cranial Nerves: No cranial nerve deficit  Motor: No weakness  Psychiatric:         Mood and Affect: Mood normal          Behavior: Behavior normal          Thought Content:  Thought content normal          Judgment: Judgment normal

## 2021-10-05 ENCOUNTER — OFFICE VISIT (OUTPATIENT)
Dept: FAMILY MEDICINE CLINIC | Facility: CLINIC | Age: 66
End: 2021-10-05
Payer: COMMERCIAL

## 2021-10-05 VITALS
DIASTOLIC BLOOD PRESSURE: 70 MMHG | HEIGHT: 60 IN | SYSTOLIC BLOOD PRESSURE: 122 MMHG | TEMPERATURE: 97.6 F | HEART RATE: 70 BPM | RESPIRATION RATE: 18 BRPM | BODY MASS INDEX: 24.74 KG/M2 | WEIGHT: 126 LBS

## 2021-10-05 DIAGNOSIS — Z00.00 ANNUAL PHYSICAL EXAM: Primary | ICD-10-CM

## 2021-10-05 DIAGNOSIS — Z12.31 SCREENING MAMMOGRAM FOR BREAST CANCER: ICD-10-CM

## 2021-10-05 DIAGNOSIS — F41.9 ANXIETY: ICD-10-CM

## 2021-10-05 PROCEDURE — 99397 PER PM REEVAL EST PAT 65+ YR: CPT | Performed by: INTERNAL MEDICINE

## 2021-10-05 RX ORDER — ALPRAZOLAM 0.5 MG/1
0.5 TABLET ORAL DAILY
Qty: 90 TABLET | Refills: 0 | Status: SHIPPED | OUTPATIENT
Start: 2021-10-05 | End: 2022-04-05 | Stop reason: SDUPTHER

## 2021-10-07 ENCOUNTER — OFFICE VISIT (OUTPATIENT)
Dept: SLEEP CENTER | Facility: CLINIC | Age: 66
End: 2021-10-07
Payer: COMMERCIAL

## 2021-10-07 VITALS
HEART RATE: 72 BPM | SYSTOLIC BLOOD PRESSURE: 120 MMHG | WEIGHT: 126 LBS | HEIGHT: 60 IN | RESPIRATION RATE: 16 BRPM | DIASTOLIC BLOOD PRESSURE: 56 MMHG | OXYGEN SATURATION: 96 % | TEMPERATURE: 96.4 F | BODY MASS INDEX: 24.74 KG/M2

## 2021-10-07 DIAGNOSIS — F41.9 ANXIETY: ICD-10-CM

## 2021-10-07 DIAGNOSIS — K21.9 GASTROESOPHAGEAL REFLUX DISEASE, UNSPECIFIED WHETHER ESOPHAGITIS PRESENT: ICD-10-CM

## 2021-10-07 DIAGNOSIS — R68.2 DRY MOUTH: ICD-10-CM

## 2021-10-07 DIAGNOSIS — G47.33 OSA (OBSTRUCTIVE SLEEP APNEA): Primary | ICD-10-CM

## 2021-10-07 DIAGNOSIS — G47.9 SLEEP DISTURBANCE: ICD-10-CM

## 2021-10-07 DIAGNOSIS — I10 ESSENTIAL HYPERTENSION: ICD-10-CM

## 2021-10-07 PROCEDURE — 99214 OFFICE O/P EST MOD 30 MIN: CPT | Performed by: INTERNAL MEDICINE

## 2022-01-12 ENCOUNTER — TELEPHONE (OUTPATIENT)
Dept: FAMILY MEDICINE CLINIC | Facility: CLINIC | Age: 67
End: 2022-01-12

## 2022-03-10 ENCOUNTER — HOSPITAL ENCOUNTER (OUTPATIENT)
Dept: MAMMOGRAPHY | Facility: HOSPITAL | Age: 67
Discharge: HOME | End: 2022-03-10
Attending: INTERNAL MEDICINE
Payer: COMMERCIAL

## 2022-03-10 VITALS — BODY MASS INDEX: 24.76 KG/M2 | HEIGHT: 60 IN | WEIGHT: 126.1 LBS

## 2022-03-10 DIAGNOSIS — Z12.31 SCREENING MAMMOGRAM FOR BREAST CANCER: ICD-10-CM

## 2022-03-10 PROCEDURE — 77067 SCR MAMMO BI INCL CAD: CPT

## 2022-03-10 PROCEDURE — 77063 BREAST TOMOSYNTHESIS BI: CPT

## 2022-03-29 ENCOUNTER — APPOINTMENT (OUTPATIENT)
Dept: LAB | Facility: HOSPITAL | Age: 67
End: 2022-03-29
Attending: INTERNAL MEDICINE
Payer: COMMERCIAL

## 2022-03-29 DIAGNOSIS — Z00.00 ANNUAL PHYSICAL EXAM: ICD-10-CM

## 2022-03-29 DIAGNOSIS — E55.9 VITAMIN D DEFICIENCY: ICD-10-CM

## 2022-03-29 LAB
25(OH)D3 SERPL-MCNC: 58.6 NG/ML (ref 30–100)
ALBUMIN SERPL BCP-MCNC: 3.6 G/DL (ref 3.5–5)
ALP SERPL-CCNC: 98 U/L (ref 46–116)
ALT SERPL W P-5'-P-CCNC: 36 U/L (ref 12–78)
ANION GAP SERPL CALCULATED.3IONS-SCNC: 9 MMOL/L (ref 4–13)
AST SERPL W P-5'-P-CCNC: 22 U/L (ref 5–45)
BILIRUB SERPL-MCNC: 0.46 MG/DL (ref 0.2–1)
BUN SERPL-MCNC: 14 MG/DL (ref 5–25)
CALCIUM SERPL-MCNC: 9 MG/DL (ref 8.3–10.1)
CHLORIDE SERPL-SCNC: 106 MMOL/L (ref 100–108)
CHOLEST SERPL-MCNC: 208 MG/DL
CO2 SERPL-SCNC: 26 MMOL/L (ref 21–32)
CREAT SERPL-MCNC: 0.79 MG/DL (ref 0.6–1.3)
EST. AVERAGE GLUCOSE BLD GHB EST-MCNC: 114 MG/DL
GFR SERPL CREATININE-BSD FRML MDRD: 77 ML/MIN/1.73SQ M
GLUCOSE P FAST SERPL-MCNC: 124 MG/DL (ref 65–99)
HBA1C MFR BLD: 5.6 %
HDLC SERPL-MCNC: 55 MG/DL
LDLC SERPL CALC-MCNC: 115 MG/DL (ref 0–100)
NONHDLC SERPL-MCNC: 153 MG/DL
POTASSIUM SERPL-SCNC: 4.2 MMOL/L (ref 3.5–5.3)
PROT SERPL-MCNC: 6.9 G/DL (ref 6.4–8.2)
SODIUM SERPL-SCNC: 141 MMOL/L (ref 136–145)
TRIGL SERPL-MCNC: 192 MG/DL

## 2022-03-29 PROCEDURE — 80053 COMPREHEN METABOLIC PANEL: CPT

## 2022-03-29 PROCEDURE — 83036 HEMOGLOBIN GLYCOSYLATED A1C: CPT

## 2022-03-29 PROCEDURE — 82306 VITAMIN D 25 HYDROXY: CPT

## 2022-03-29 PROCEDURE — 36415 COLL VENOUS BLD VENIPUNCTURE: CPT

## 2022-03-29 PROCEDURE — 80061 LIPID PANEL: CPT

## 2022-04-05 ENCOUNTER — OFFICE VISIT (OUTPATIENT)
Dept: FAMILY MEDICINE CLINIC | Facility: CLINIC | Age: 67
End: 2022-04-05
Payer: COMMERCIAL

## 2022-04-05 VITALS
BODY MASS INDEX: 25.21 KG/M2 | SYSTOLIC BLOOD PRESSURE: 126 MMHG | TEMPERATURE: 97.7 F | HEIGHT: 60 IN | WEIGHT: 128.4 LBS | DIASTOLIC BLOOD PRESSURE: 76 MMHG | HEART RATE: 76 BPM | RESPIRATION RATE: 18 BRPM

## 2022-04-05 DIAGNOSIS — I10 HYPERTENSION, UNSPECIFIED TYPE: ICD-10-CM

## 2022-04-05 DIAGNOSIS — F41.9 ANXIETY: ICD-10-CM

## 2022-04-05 DIAGNOSIS — G47.33 OBSTRUCTIVE SLEEP APNEA: Primary | ICD-10-CM

## 2022-04-05 DIAGNOSIS — K21.9 GASTROESOPHAGEAL REFLUX DISEASE, UNSPECIFIED WHETHER ESOPHAGITIS PRESENT: ICD-10-CM

## 2022-04-05 PROCEDURE — 99214 OFFICE O/P EST MOD 30 MIN: CPT | Performed by: INTERNAL MEDICINE

## 2022-04-05 RX ORDER — LISINOPRIL 10 MG/1
10 TABLET ORAL DAILY
Qty: 90 TABLET | Refills: 3 | Status: SHIPPED | OUTPATIENT
Start: 2022-04-05 | End: 2022-05-04 | Stop reason: SDUPTHER

## 2022-04-05 RX ORDER — ALPRAZOLAM 0.5 MG/1
0.5 TABLET ORAL DAILY
Qty: 90 TABLET | Refills: 0 | Status: SHIPPED | OUTPATIENT
Start: 2022-04-05

## 2022-04-05 RX ORDER — BUPROPION HYDROCHLORIDE 150 MG/1
150 TABLET ORAL EVERY MORNING
Qty: 90 TABLET | Refills: 3 | Status: SHIPPED | OUTPATIENT
Start: 2022-04-05

## 2022-04-05 NOTE — PROGRESS NOTES
BMI Counseling: Body mass index is 25 08 kg/m²  The BMI is above normal  Nutrition recommendations include decreasing portion sizes, limiting drinks that contain sugar and moderation in carbohydrate intake  Rationale for BMI follow-up plan is due to patient being overweight or obese  Assessment/Plan:         Diagnoses and all orders for this visit:    Obstructive sleep apnea  Pt still waiting for replacement equipment     Anxiety  -     ALPRAZolam (XANAX) 0 5 mg tablet; Take 1 tablet (0 5 mg total) by mouth daily  -     buPROPion (WELLBUTRIN XL) 150 mg 24 hr tablet; Take 1 tablet (150 mg total) by mouth every morning  Doing ok and working has been a help to keep her busy    Hypertension, unspecified type  -     lisinopril (ZESTRIL) 10 mg tablet; Take 1 tablet (10 mg total) by mouth daily  No added salt Continue current rx    Gastroesophageal reflux disease, unspecified whether esophagitis present  Roz Cary diet Increase water     Reviewed wellness labs - limit fast food and exercise as able to improve triglyceride levels   Rto 6 months     Patient ID: Chanel Multani is a 74 y o  female  HPI  Pt doing ok Still working FT and feels that helps her to keep busy and stay balanced as she does not like to be alone a lot She sees her grandchildren and family often No acute issues She is not planning booster since she had rash after both doses She had labs and wants to review Still waiting for her cpap replacement and spoke with company they said could take til September She is using her old model for now       Review of Systems   Constitutional: Negative for chills and fever  HENT: Negative  Eyes: Negative for visual disturbance  Respiratory: Negative for cough and shortness of breath  Cardiovascular: Negative for chest pain, palpitations and leg swelling  Gastrointestinal: Negative for abdominal distention and abdominal pain  Genitourinary: Negative  Musculoskeletal: Negative      Neurological: Negative for dizziness, light-headedness and numbness  Psychiatric/Behavioral: Negative for sleep disturbance  The patient is not nervous/anxious  Past Medical History:   Diagnosis Date    CPAP (continuous positive airway pressure) dependence     DJD (degenerative joint disease) of cervical spine     Hyperglycemia     last assessed 16    Hypertension     Medullary sponge kidney     Sleep apnea      Past Surgical History:   Procedure Laterality Date    CATARACT EXTRACTION Left 2011     SECTION      unknown onset    CYSTOSCOPY W/ URETERAL STENT PLACEMENT Right     CYSTOSCOPY W/ URETERAL STENT REMOVAL Right 10/27/2015    right stent    DILATION AND CURETTAGE OF UTERUS      of cervical stump unknown onset    OOPHORECTOMY Left     unknown onset    DE REPAIR UMBILICAL TSUL,7+F/D,OVJDU N/A 10/23/2018    Procedure: REPAIR HERNIA UMBILICAL;  Surgeon: Codi Garcia DO;  Location: [facility];  Service: General    TOTAL ABDOMINAL HYSTERECTOMY      w/ removal of both ovaries, last assessed 14     Social History     Socioeconomic History    Marital status:       Spouse name: Not on file    Number of children: Not on file    Years of education: Not on file    Highest education level: Not on file   Occupational History    Not on file   Tobacco Use    Smoking status: Never Smoker    Smokeless tobacco: Never Used   Vaping Use    Vaping Use: Never used   Substance and Sexual Activity    Alcohol use: Yes     Comment: 3x per week wine or beer    Drug use: No    Sexual activity: Not on file   Other Topics Concern    Not on file   Social History Narrative    Dental care occasionally    Lives with spouse    No advance directives    No caffeine use    Sun screen worn    Uses seatbelt     Social Determinants of Health     Financial Resource Strain: Not on file   Food Insecurity: Not on file   Transportation Needs: Not on file   Physical Activity: Not on file   Stress: Not on file   Social Connections: Not on file   Intimate Partner Violence: Not on file   Housing Stability: Not on file     No Known Allergies        /76   Pulse 76   Temp 97 7 °F (36 5 °C) (Temporal)   Resp 18   Ht 5' (1 524 m)   Wt 58 2 kg (128 lb 6 4 oz)   BMI 25 08 kg/m²          Physical Exam  Vitals reviewed  Constitutional:       General: She is not in acute distress  Appearance: Normal appearance  She is not ill-appearing, toxic-appearing or diaphoretic  HENT:      Head: Normocephalic and atraumatic  Right Ear: External ear normal       Left Ear: External ear normal       Nose: Nose normal       Mouth/Throat:      Mouth: Mucous membranes are dry  Eyes:      General: No scleral icterus  Extraocular Movements: Extraocular movements intact  Conjunctiva/sclera: Conjunctivae normal       Pupils: Pupils are equal, round, and reactive to light  Cardiovascular:      Rate and Rhythm: Normal rate and regular rhythm  Pulses: Normal pulses  Heart sounds: Normal heart sounds  No murmur heard  Pulmonary:      Effort: Pulmonary effort is normal  No respiratory distress  Breath sounds: Normal breath sounds  No wheezing  Abdominal:      General: Bowel sounds are normal  There is no distension  Palpations: Abdomen is soft  Tenderness: There is no abdominal tenderness  Musculoskeletal:      Cervical back: Normal range of motion and neck supple  No rigidity  Right lower leg: No edema  Left lower leg: No edema  Lymphadenopathy:      Cervical: No cervical adenopathy  Skin:     General: Skin is dry  Coloration: Skin is not jaundiced or pale  Neurological:      General: No focal deficit present  Mental Status: She is alert and oriented to person, place, and time  Mental status is at baseline  Cranial Nerves: No cranial nerve deficit     Psychiatric:         Mood and Affect: Mood normal          Behavior: Behavior normal          Thought Content: Thought content normal          Judgment: Judgment normal

## 2022-05-04 DIAGNOSIS — I10 HYPERTENSION, UNSPECIFIED TYPE: ICD-10-CM

## 2022-05-04 RX ORDER — ATENOLOL 50 MG/1
50 TABLET ORAL DAILY
Qty: 90 TABLET | Refills: 3 | Status: SHIPPED | OUTPATIENT
Start: 2022-05-04

## 2022-05-04 RX ORDER — LISINOPRIL 10 MG/1
10 TABLET ORAL DAILY
Qty: 90 TABLET | Refills: 3 | Status: SHIPPED | OUTPATIENT
Start: 2022-05-04 | End: 2022-08-09 | Stop reason: SDUPTHER

## 2022-08-09 DIAGNOSIS — I10 HYPERTENSION, UNSPECIFIED TYPE: ICD-10-CM

## 2022-08-09 RX ORDER — LISINOPRIL 10 MG/1
10 TABLET ORAL DAILY
Qty: 90 TABLET | Refills: 3 | Status: SHIPPED | OUTPATIENT
Start: 2022-08-09

## 2022-08-31 DIAGNOSIS — I10 HYPERTENSION, UNSPECIFIED TYPE: ICD-10-CM

## 2022-08-31 RX ORDER — ATENOLOL 50 MG/1
50 TABLET ORAL DAILY
Qty: 90 TABLET | Refills: 3 | Status: SHIPPED | OUTPATIENT
Start: 2022-08-31

## 2022-10-04 ENCOUNTER — TELEPHONE (OUTPATIENT)
Dept: FAMILY MEDICINE CLINIC | Facility: CLINIC | Age: 67
End: 2022-10-04

## 2022-10-04 NOTE — TELEPHONE ENCOUNTER
Pt tested positive for covid at work yesterday, 10/3/22  I advised her of isolation guidelines and OTC rx to be taking  Said she feels okay overall and will manage with OTC for right now  Advised to call back if sxs worsen/ continue

## 2022-10-06 ENCOUNTER — TELEPHONE (OUTPATIENT)
Dept: FAMILY MEDICINE CLINIC | Facility: CLINIC | Age: 67
End: 2022-10-06

## 2022-10-06 NOTE — TELEPHONE ENCOUNTER
Pt called asking to be released back to work this Saturday 10/08/22  Would need a note to excuse from being off Tuesday, Wednesday and Friday  Pt said she can  tomorrow  Please advise  Tested positive on Monday

## 2022-10-07 ENCOUNTER — TELEPHONE (OUTPATIENT)
Dept: FAMILY MEDICINE CLINIC | Facility: CLINIC | Age: 67
End: 2022-10-07

## 2022-10-07 NOTE — TELEPHONE ENCOUNTER
Ok to take otc meds   Generally do not have to retest but she should check with 5[facility] floor as not sure if they have a different policy

## 2022-10-07 NOTE — TELEPHONE ENCOUNTER
PT IS ASKING ABOUT MUCINEX AND NYQUIL IF SHE IS ABLE TO TAKE BOTH   SHE ALSO IS WONDERING WHEN SHE IS TO GET TESTED AGAIN SINCE SHE [facility] [facility] [address] [address]- PLEASE CALL HER [phone]

## 2022-10-25 ENCOUNTER — OFFICE VISIT (OUTPATIENT)
Dept: FAMILY MEDICINE CLINIC | Facility: CLINIC | Age: 67
End: 2022-10-25
Payer: COMMERCIAL

## 2022-10-25 VITALS
BODY MASS INDEX: 25.84 KG/M2 | SYSTOLIC BLOOD PRESSURE: 126 MMHG | TEMPERATURE: 98 F | HEART RATE: 76 BPM | WEIGHT: 131.6 LBS | DIASTOLIC BLOOD PRESSURE: 78 MMHG | RESPIRATION RATE: 18 BRPM | HEIGHT: 60 IN

## 2022-10-25 DIAGNOSIS — M79.671 PAIN OF RIGHT HEEL: ICD-10-CM

## 2022-10-25 DIAGNOSIS — M25.552 HIP PAIN, ACUTE, LEFT: ICD-10-CM

## 2022-10-25 DIAGNOSIS — Z78.0 POSTMENOPAUSAL: ICD-10-CM

## 2022-10-25 DIAGNOSIS — Z86.16 HISTORY OF COVID-19: ICD-10-CM

## 2022-10-25 DIAGNOSIS — R73.9 HYPERGLYCEMIA: ICD-10-CM

## 2022-10-25 DIAGNOSIS — G47.33 OBSTRUCTIVE SLEEP APNEA: ICD-10-CM

## 2022-10-25 DIAGNOSIS — I10 PRIMARY HYPERTENSION: ICD-10-CM

## 2022-10-25 DIAGNOSIS — Z00.00 ANNUAL PHYSICAL EXAM: Primary | ICD-10-CM

## 2022-10-25 PROCEDURE — 99397 PER PM REEVAL EST PAT 65+ YR: CPT | Performed by: INTERNAL MEDICINE

## 2022-10-25 RX ORDER — PHENOL 1.4 %
600 AEROSOL, SPRAY (ML) MUCOUS MEMBRANE DAILY
COMMUNITY

## 2022-10-25 NOTE — PATIENT INSTRUCTIONS

## 2022-10-25 NOTE — PROGRESS NOTES
[facility]    NAME: Doyle Can  AGE: 69 y o  SEX: female  : 1955     DATE: 10/25/2022     Assessment and Plan:     Problem List Items Addressed This Visit        Respiratory    Obstructive sleep apnea       Cardiovascular and Mediastinum    Hypertension    Relevant Orders    Comprehensive metabolic panel    Lipid panel    HEMOGLOBIN A1C W/ EAG ESTIMATION       Other    Annual physical exam - Primary    History of COVID-19      Other Visit Diagnoses     Postmenopausal        Relevant Orders    DXA bone density spine hip and pelvis    Hip pain, acute, left        Relevant Orders    XR hip/pelv 2-3 vws right if performed    XR ankle 3+ vw left    XR spine lumbar minimum 4 views non injury    Pain of right heel        Relevant Orders    XR foot 3+ vw right    Hyperglycemia        Relevant Orders    Comprehensive metabolic panel    HEMOGLOBIN A1C W/ EAG ESTIMATION      Xrays ordered and may need podiatry   She is still awaiting cpap new machine  Stay hydrated   Gel inserts and supportive shoes   Rto 6 months    Immunizations and preventive care screenings were discussed with patient today  Appropriate education was printed on patient's after visit summary  Counseling:  · Exercise: the importance of regular exercise/physical activity was discussed  Recommend exercise 3-5 times per week for at least 30 minutes  Depression Screening and Follow-up Plan: Patient was screened for depression during today's encounter  They screened negative with a PHQ-2 score of 1  Return in about 6 months (around 2023), or if symptoms worsen or fail to improve, for Recheck  Chief Complaint:     Chief Complaint   Patient presents with   • Annual Exam   • Back Pain     Lower back, going on for a few days now         History of Present Illness:     Adult Annual Physical   Patient here for a comprehensive physical exam  The patient reports recovered from covid Mild sxs Still has on and off lost voice but no other sxs Has hip and hell pain ongoing off and on    Diet and Physical Activity  · Diet/Nutrition: well balanced diet  · Exercise: no formal exercise  Depression Screening  PHQ-2/9 Depression Screening    Little interest or pleasure in doing things: 0 - not at all  Feeling down, depressed, or hopeless: 1 - several days  PHQ-2 Score: 1  PHQ-2 Interpretation: Negative depression screen       General Health  · Sleep: gets 7-8 hours of sleep on average  · Hearing: normal - bilateral   · Vision: no vision problems  · Dental: regular dental visits  /GYN Health  · Patient is: postmenopausal  · Last menstrual period: years  · Contraceptive method: n/a  Review of Systems:     Review of Systems   Constitutional: Negative for chills and fever  HENT: Positive for postnasal drip  Eyes: Negative for visual disturbance  Respiratory: Negative for cough and shortness of breath  Cardiovascular: Negative for chest pain, palpitations and leg swelling  Gastrointestinal: Negative for abdominal distention and abdominal pain  Genitourinary: Negative  Musculoskeletal: Positive for arthralgias and back pain  Neurological: Negative for dizziness and headaches  Psychiatric/Behavioral: Negative for sleep disturbance  The patient is not nervous/anxious         Past Medical History:     Past Medical History:   Diagnosis Date   • CPAP (continuous positive airway pressure) dependence    • DJD (degenerative joint disease) of cervical spine    • History of COVID-19 10/03/2022   • Hyperglycemia     last assessed 16   • Hypertension    • Medullary sponge kidney    • Sleep apnea       Past Surgical History:     Past Surgical History:   Procedure Laterality Date   • CATARACT EXTRACTION Left 2011   •  SECTION      unknown onset   • CYSTOSCOPY W/ URETERAL STENT PLACEMENT Right    • CYSTOSCOPY W/ URETERAL STENT REMOVAL Right 10/27/2015    right stent   • DILATION AND CURETTAGE OF UTERUS      of cervical stump unknown onset   • OOPHORECTOMY Left     unknown onset   • TN REPAIR UMBILICAL PEFV,7+R/N,LOPPA N/A 10/23/2018    Procedure: REPAIR HERNIA UMBILICAL;  Surgeon: Rhoda Morrison DO;  Location: [facility];  Service: General   • TOTAL ABDOMINAL HYSTERECTOMY      w/ removal of both ovaries, last assessed 8/21/14      Social History:     Social History     Socioeconomic History   • Marital status:       Spouse name: None   • Number of children: None   • Years of education: None   • Highest education level: None   Occupational History   • None   Tobacco Use   • Smoking status: Never Smoker   • Smokeless tobacco: Never Used   Vaping Use   • Vaping Use: Never used   Substance and Sexual Activity   • Alcohol use: Yes     Comment: 3x per week wine or beer   • Drug use: No   • Sexual activity: None   Other Topics Concern   • None   Social History Narrative    Dental care occasionally    Lives with spouse    No advance directives    No caffeine use    Sun screen worn    Uses seatbelt     Social Determinants of Health     Financial Resource Strain: Not on file   Food Insecurity: Not on file   Transportation Needs: Not on file   Physical Activity: Not on file   Stress: Not on file   Social Connections: Not on file   Intimate Partner Violence: Not on file   Housing Stability: Not on file      Family History:     Family History   Problem Relation Age of Onset   • Coronary artery disease Mother    • Hypertension Mother    • Stroke Mother         syndrome   • Diabetes Family    • Pancreatic cancer Father    • No Known Problems Daughter    • No Known Problems Maternal Grandmother    • No Known Problems Maternal Grandfather    • Endometrial cancer Paternal Grandmother    • Leukemia Paternal Grandfather    • Lung cancer Paternal Aunt    • Lung cancer Paternal Uncle       Current Medications:     Current Outpatient Medications   Medication Sig Dispense Refill   • ALPRAZolam (XANAX) 0 5 mg tablet Take 1 tablet (0 5 mg total) by mouth daily 90 tablet 0   • ascorbic acid (VITAMIN C) 500 mg tablet Take 500 mg by mouth daily     • atenolol (TENORMIN) 50 mg tablet Take 1 tablet (50 mg total) by mouth daily 90 tablet 3   • buPROPion (WELLBUTRIN XL) 150 mg 24 hr tablet Take 1 tablet (150 mg total) by mouth every morning 90 tablet 3   • calcium carbonate (OS-JUDY) 600 MG tablet Take 600 mg by mouth daily     • Cholecalciferol (VITAMIN D3) 2000 units capsule Take 5,000 Units by mouth      • lisinopril (ZESTRIL) 10 mg tablet Take 1 tablet (10 mg total) by mouth daily 90 tablet 3   • valACYclovir (VALTREX) 1,000 mg tablet Take 1 tablet (1,000 mg total) by mouth 3 (three) times a day for 10 days (Patient not taking: No sig reported) 30 tablet 0     No current facility-administered medications for this visit  Allergies:     No Known Allergies   Physical Exam:     /78   Pulse 76   Temp 98 °F (36 7 °C) (Temporal)   Resp 18   Ht 5' (1 524 m)   Wt 59 7 kg (131 lb 9 6 oz)   BMI 25 70 kg/m²     Physical Exam  Vitals reviewed  Constitutional:       General: She is not in acute distress  Appearance: Normal appearance  She is not ill-appearing, toxic-appearing or diaphoretic  HENT:      Head: Normocephalic and atraumatic  Right Ear: External ear normal       Left Ear: External ear normal       Nose: Nose normal       Mouth/Throat:      Mouth: Mucous membranes are dry  Eyes:      General: No scleral icterus  Extraocular Movements: Extraocular movements intact  Conjunctiva/sclera: Conjunctivae normal       Pupils: Pupils are equal, round, and reactive to light  Cardiovascular:      Rate and Rhythm: Normal rate and regular rhythm  Pulses: Normal pulses  Heart sounds: Normal heart sounds  Pulmonary:      Effort: Pulmonary effort is normal  No respiratory distress  Breath sounds: Normal breath sounds  No wheezing  Abdominal:      General: Bowel sounds are normal  There is no distension  Palpations: Abdomen is soft  Tenderness: There is no abdominal tenderness  Musculoskeletal:      Cervical back: Normal range of motion and neck supple  No rigidity  Right lower leg: No edema  Left lower leg: No edema  Lymphadenopathy:      Cervical: No cervical adenopathy  Skin:     General: Skin is dry  Coloration: Skin is not jaundiced or pale  Neurological:      General: No focal deficit present  Mental Status: She is alert and oriented to person, place, and time  Mental status is at baseline  Psychiatric:         Mood and Affect: Mood normal          Behavior: Behavior normal          Thought Content:  Thought content normal          Judgment: Judgment normal           Gage Diamond DO  [address] [facility]

## 2022-11-07 ENCOUNTER — HOSPITAL ENCOUNTER (OUTPATIENT)
Dept: RADIOLOGY | Facility: HOSPITAL | Age: 67
Discharge: HOME | End: 2022-11-07
Attending: INTERNAL MEDICINE

## 2022-11-07 DIAGNOSIS — M25.552 HIP PAIN, ACUTE, LEFT: ICD-10-CM

## 2022-11-07 DIAGNOSIS — M79.671 PAIN OF RIGHT HEEL: ICD-10-CM

## 2022-11-11 DIAGNOSIS — M25.552 LEFT HIP PAIN: Primary | ICD-10-CM

## 2022-11-14 ENCOUNTER — HOSPITAL ENCOUNTER (OUTPATIENT)
Dept: RADIOLOGY | Facility: HOSPITAL | Age: 67
Discharge: HOME | End: 2022-11-14
Attending: INTERNAL MEDICINE

## 2022-11-14 DIAGNOSIS — M25.552 LEFT HIP PAIN: ICD-10-CM

## 2023-01-05 DIAGNOSIS — F41.9 ANXIETY: ICD-10-CM

## 2023-01-05 RX ORDER — ALPRAZOLAM 0.5 MG/1
0.5 TABLET ORAL DAILY
Qty: 90 TABLET | Refills: 0 | Status: SHIPPED | OUTPATIENT
Start: 2023-01-05

## 2023-02-03 ENCOUNTER — HOSPITAL ENCOUNTER (OUTPATIENT)
Dept: BONE DENSITY | Facility: HOSPITAL | Age: 68
Discharge: HOME | End: 2023-02-03
Attending: INTERNAL MEDICINE

## 2023-02-03 VITALS — HEIGHT: 61 IN | BODY MASS INDEX: 24.55 KG/M2 | WEIGHT: 130 LBS

## 2023-02-03 DIAGNOSIS — Z78.0 POSTMENOPAUSAL: ICD-10-CM

## 2023-03-16 ENCOUNTER — TELEPHONE (OUTPATIENT)
Dept: FAMILY MEDICINE CLINIC | Facility: CLINIC | Age: 68
End: 2023-03-16

## 2023-03-16 NOTE — TELEPHONE ENCOUNTER
No answer, no way to leave message     She can check in caring starts with you, the ring will be closed, if it counts

## 2023-03-16 NOTE — TELEPHONE ENCOUNTER
Pt said caring starts with you started today and she's asking if her lipid panel results from 3/2022 can be used or if they have to be used from more recent labs? Please advise

## 2023-03-16 NOTE — TELEPHONE ENCOUNTER
Did not get the information yet but they usually ist from what date they will accept labs/physical   Last year it was from the Fall prior to end date but nit sure what they plan yet for this year

## 2023-04-26 ENCOUNTER — TELEPHONE (OUTPATIENT)
Dept: SLEEP CENTER | Facility: CLINIC | Age: 68
End: 2023-04-26

## 2023-04-26 DIAGNOSIS — F41.9 ANXIETY: ICD-10-CM

## 2023-04-26 RX ORDER — ALPRAZOLAM 0.5 MG/1
0.5 TABLET ORAL DAILY
Qty: 90 TABLET | Refills: 0 | Status: SHIPPED | OUTPATIENT
Start: 2023-04-26

## 2023-04-26 NOTE — TELEPHONE ENCOUNTER
Returned the patient's call    Advised her that she will need to see Dr Cal Viera at her scheduled appointment in May to get an RX to send to [facility] for her replacement machine

## 2023-04-27 DIAGNOSIS — I10 HYPERTENSION, UNSPECIFIED TYPE: ICD-10-CM

## 2023-04-27 RX ORDER — LISINOPRIL 10 MG/1
10 TABLET ORAL DAILY
Qty: 90 TABLET | Refills: 3 | Status: SHIPPED | OUTPATIENT
Start: 2023-04-27

## 2023-05-11 ENCOUNTER — OFFICE VISIT (OUTPATIENT)
Dept: SLEEP CENTER | Facility: CLINIC | Age: 68
End: 2023-05-11

## 2023-05-11 VITALS
WEIGHT: 129.8 LBS | BODY MASS INDEX: 25.48 KG/M2 | SYSTOLIC BLOOD PRESSURE: 90 MMHG | DIASTOLIC BLOOD PRESSURE: 60 MMHG | TEMPERATURE: 97.7 F | HEIGHT: 60 IN | OXYGEN SATURATION: 94 % | HEART RATE: 73 BPM

## 2023-05-11 DIAGNOSIS — R68.2 DRY MOUTH: ICD-10-CM

## 2023-05-11 DIAGNOSIS — F41.9 ANXIETY: ICD-10-CM

## 2023-05-11 DIAGNOSIS — I10 ESSENTIAL HYPERTENSION: ICD-10-CM

## 2023-05-11 DIAGNOSIS — G47.9 SLEEP DISTURBANCE: ICD-10-CM

## 2023-05-11 DIAGNOSIS — G47.33 OSA (OBSTRUCTIVE SLEEP APNEA): Primary | ICD-10-CM

## 2023-05-11 DIAGNOSIS — K21.9 GASTROESOPHAGEAL REFLUX DISEASE, UNSPECIFIED WHETHER ESOPHAGITIS PRESENT: ICD-10-CM

## 2023-05-11 NOTE — PATIENT INSTRUCTIONS

## 2023-05-11 NOTE — PROGRESS NOTES
Follow-Up Note - [facility]   Rolan Armijo  69 y o  female  SFW:[id number]  OEN:[id number]  DOS:5/11/2023    CC: I saw this patient for follow-up in clinic today for Sleep disordered breathing, Coexisting Sleep and Medical Problems  She is using a DreamStation 1 machine that she has registered from [facility] but is still awaiting a replacement  Interval changes: Modem is malfunctioning and DME provider was unable to download data even from the [employer] card  Results of  of a diagnostic study in 2006:  AHI of 20 per hour with minimum oxygen saturation of 86%  prior studies in 2015:  A retitration study in 2015 demonstrated sleep disordered breathing was adequately remediated with nasal CPAP at 8 cm H2O  PFSH, Problem List, Medications & Allergies were reviewed in EMR  She  has a past medical history of CPAP (continuous positive airway pressure) dependence, DJD (degenerative joint disease) of cervical spine, History of COVID-19 (10/03/2022), Hyperglycemia, Hypertension, Medullary sponge kidney, and Sleep apnea  She has a current medication list which includes the following prescription(s): alprazolam, ascorbic acid, atenolol, bupropion, calcium carbonate, vitamin d3, and lisinopril  PHYSIOLOGICAL DATA REVIEW : No data was available, but she reports regular use of the device for > 4 hours/night  ;  Patient has not been using non FDA approved devices to sanitize the machine  INTERPRETATION: Based on prior data and her report, compliance is Very good; Pressure setting is:within target range; ;   SUBJECTIVE: With respect to use of PAP, Alesha Ortez  is experiencing some adverse effects:dry mouth/throat  She derives benefit  Is satisfied with sleep and daytime function  Sleep Routine: Alesha Ortez reports getting 7 hrs sleep on workdays and more on days off; she has no difficulty initiating or maintaining sleep   She arises before alarm most days and feels refreshed  Alesha Ortez denies excessive daytime sleepiness,  She rated [herself] at Total score: 1 /24 on the [place] Sleepiness Scale  Other issues: None reported  Habits:[ reports that she has never smoked  She has never used smokeless tobacco ], [ reports current alcohol use ], [ reports no history of drug use ], Caffeine use:limited; Exercise routine: none is physically active in her job  ROS: Significant for weight has been stable  Acid reflux and anxiety are controlled on current medications  Michael Rho EXAM: BP 90/60 (BP Location: Right arm, Cuff Size: Standard)   Pulse 73   Temp 97 7 °F (36 5 °C) (Temporal)   Ht 5' (1 524 m)   Wt 58 9 kg (129 lb 12 8 oz)   SpO2 94%   BMI 25 35 kg/m²     Wt Readings from Last 3 Encounters:   05/11/23 58 9 kg (129 lb 12 8 oz)   02/03/23 59 kg (130 lb)   10/25/22 59 7 kg (131 lb 9 6 oz)      Patient is well groomed; well appearing  CNS: Alert, orientated, clear & coherent speech  Psych: cooperative and in no distress  Mental state: Appears normal   H&N: EOMI; NC/AT: No facial pressure marks, no rashes  Skin/Extrem: col & hydration normal; no edema  Resp: Respiratory effort is normal  Physical findings otherwise essentially unchanged from previous  IMPRESSION: Problem List Items & Comorbidities Addressed this Visit    1  GABRIELLE (obstructive sleep apnea)  PAP DME Pressure Change    PAP DME Resupply/Reorder      2  Sleep disturbance        3  Dry mouth        4  Anxiety        5  Essential hypertension        6  Gastroesophageal reflux disease, unspecified whether esophagitis present            PLAN:  1  I reviewed results of prior studies and physiologic data with the patient  2  I discussed treatment options with risks and benefits  3  Treatment with  PAP is medically necessary and Mica Alba is agreable to continue use  4  Care of equipment, methods to improve comfort using PAP and importance of compliance with therapy were discussed    5  Pressure setting: continue 8-12 cmH2O     6  Rx provided to replace supplies and "Care coordinated with DME provider  7  Discussed strategies for weight reduction  8  Follow-up is advised in 1 year or sooner if needed to monitor progress, compliance and to adjust therapy  Thank you for allowing me to participate in the care of this patient  Sincerely,     Authenticated electronically on 88/30/81   Board Certified Specialist     Portions of the record may have been created with voice recognition software  Occasional wrong word or \"sound a like\" substitutions may have occurred due to the inherent limitations of voice recognition software  There may also be notations and random deletions of words or characters from malfunctioning software  Read the chart carefully and recognize, using context, where substitutions/deletions have occurred      "

## 2023-05-12 ENCOUNTER — TELEPHONE (OUTPATIENT)
Dept: SLEEP CENTER | Facility: CLINIC | Age: 68
End: 2023-05-12

## 2023-05-12 NOTE — TELEPHONE ENCOUNTER
[facility] 
10-Oct-2017 15:08

## 2023-05-15 LAB

## 2023-06-05 ENCOUNTER — OFFICE VISIT (OUTPATIENT)
Dept: FAMILY MEDICINE CLINIC | Facility: CLINIC | Age: 68
End: 2023-06-05
Payer: COMMERCIAL

## 2023-06-05 ENCOUNTER — TELEPHONE (OUTPATIENT)
Dept: SLEEP CENTER | Facility: CLINIC | Age: 68
End: 2023-06-05

## 2023-06-05 VITALS
DIASTOLIC BLOOD PRESSURE: 68 MMHG | RESPIRATION RATE: 18 BRPM | SYSTOLIC BLOOD PRESSURE: 126 MMHG | WEIGHT: 128.4 LBS | BODY MASS INDEX: 25.21 KG/M2 | HEIGHT: 60 IN | HEART RATE: 70 BPM | TEMPERATURE: 97.2 F

## 2023-06-05 DIAGNOSIS — G47.33 OSA (OBSTRUCTIVE SLEEP APNEA): ICD-10-CM

## 2023-06-05 DIAGNOSIS — I10 PRIMARY HYPERTENSION: Primary | ICD-10-CM

## 2023-06-05 DIAGNOSIS — F41.9 ANXIETY: ICD-10-CM
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
## 2023-06-05 PROBLEM — M54.50 ACUTE LEFT-SIDED LOW BACK PAIN WITHOUT SCIATICA: Status: RESOLVED | Noted: 2021-06-15 | Resolved: 2023-06-05

## 2023-06-05 PROBLEM — B02.9 HERPES ZOSTER WITHOUT COMPLICATION: Status: RESOLVED | Noted: 2021-01-11 | Resolved: 2023-06-05

## 2023-06-05 PROBLEM — T63.441A BEE STING REACTION: Status: RESOLVED | Noted: 2021-08-09 | Resolved: 2023-06-05

## 2023-06-05 PROBLEM — Z86.16 HISTORY OF COVID-19: Status: RESOLVED | Noted: 2022-10-25 | Resolved: 2023-06-05

## 2023-06-05 PROCEDURE — 99214 OFFICE O/P EST MOD 30 MIN: CPT | Performed by: INTERNAL MEDICINE

## 2023-06-05 NOTE — PROGRESS NOTES
Name: Desmond Hermosillo      : 1955      MRN: [id number]  Encounter Provider: Jovan Nieves DO  Encounter Date: 2023   Encounter department: [address] [place] [address] Road     1  Primary hypertension    2  GABRIELLE (obstructive sleep apnea)    3  Anxiety  -     Ambulatory Referral to [facility]; Future      BMI Counseling: Body mass index is 25 08 kg/m²  The BMI is above normal  Nutrition recommendations include consuming healthier snacks and increasing intake of lean protein  Exercise recommendations include exercising 3-5 times per week  Rationale for BMI follow-up plan is due to patient being overweight or obese  Depression Screening and Follow-up Plan: Patient was screened for depression during today's encounter  They screened negative with a PHQ-2 score of 2  Rto 6months   Subjective      HPI   Pt doing ok but does feel lonely at times she is social but does not like being alone since her  passed away She is awaiting followup for new machine for cpap which need to be changed Breathing overall ok No acute issues Still working and she feels that keeps her going   Review of Systems   Constitutional: Negative for chills and fever  HENT: Negative  Eyes: Negative for visual disturbance  Respiratory: Negative for cough and shortness of breath  Cardiovascular: Negative for chest pain, palpitations and leg swelling  Gastrointestinal: Negative for abdominal distention and abdominal pain  Genitourinary: Negative  Musculoskeletal: Positive for arthralgias  Neurological: Negative for dizziness, light-headedness and headaches  Psychiatric/Behavioral: Negative for sleep disturbance  The patient is not nervous/anxious          Current Outpatient Medications on File Prior to Visit   Medication Sig   • ALPRAZolam (XANAX) 0 5 mg tablet Take 1 tablet (0 5 mg total) by mouth daily   • atenolol (TENORMIN) 50 mg tablet Take 1 tablet (50 mg total) by mouth daily   • buPROPion (WELLBUTRIN XL) 150 mg 24 hr tablet Take 1 tablet (150 mg total) by mouth every morning   • calcium carbonate (OS-JUDY) 600 MG tablet Take 600 mg by mouth daily   • Cholecalciferol (VITAMIN D3) 2000 units capsule Take 5,000 Units by mouth    • lisinopril (ZESTRIL) 10 mg tablet Take 1 tablet (10 mg total) by mouth daily   • [DISCONTINUED] ascorbic acid (VITAMIN C) 500 mg tablet Take 500 mg by mouth daily (Patient not taking: Reported on 6/5/2023)       Objective     /68   Pulse 70   Temp (!) 97 2 °F (36 2 °C) (Temporal)   Resp 18   Ht 5' (1 524 m)   Wt 58 2 kg (128 lb 6 4 oz)   BMI 25 08 kg/m²     Physical Exam  Vitals reviewed  Constitutional:       General: She is not in acute distress  Appearance: Normal appearance  She is not ill-appearing, toxic-appearing or diaphoretic  HENT:      Head: Normocephalic and atraumatic  Right Ear: External ear normal       Left Ear: External ear normal       Nose: Nose normal       Mouth/Throat:      Mouth: Mucous membranes are moist    Eyes:      General: No scleral icterus  Extraocular Movements: Extraocular movements intact  Conjunctiva/sclera: Conjunctivae normal       Pupils: Pupils are equal, round, and reactive to light  Cardiovascular:      Rate and Rhythm: Normal rate and regular rhythm  Pulses: Normal pulses  Pulmonary:      Effort: Pulmonary effort is normal  No respiratory distress  Breath sounds: Normal breath sounds  No wheezing  Abdominal:      General: Bowel sounds are normal  There is no distension  Palpations: Abdomen is soft  Tenderness: There is no abdominal tenderness  Musculoskeletal:      Cervical back: Normal range of motion and neck supple  Right lower leg: No edema  Left lower leg: No edema  Lymphadenopathy:      Cervical: No cervical adenopathy  Skin:     General: Skin is warm and dry  Coloration: Skin is not jaundiced or pale     Neurological:      General: No focal deficit present  Mental Status: She is alert and oriented to person, place, and time  Mental status is at baseline  Cranial Nerves: No cranial nerve deficit  Sensory: No sensory deficit  Psychiatric:         Mood and Affect: Mood normal          Behavior: Behavior normal          Thought Content:  Thought content normal          Judgment: Judgment normal        Tatyana Choi DO

## 2023-06-07 DIAGNOSIS — G47.33 OSA (OBSTRUCTIVE SLEEP APNEA): Primary | ICD-10-CM

## 2023-06-07 NOTE — TELEPHONE ENCOUNTER
Returned call from patient  Patient needs a new Rx for CPAP to receive her new machine from [facility]  Serial #: [id number]  Reference #: [id number]    Last office visit 5/11/2023  Dr Neena Cockayne notified

## 2023-08-01 ENCOUNTER — OFFICE VISIT (OUTPATIENT)
Dept: OBGYN CLINIC | Facility: CLINIC | Age: 68
End: 2023-08-01
Payer: COMMERCIAL

## 2023-08-01 ENCOUNTER — APPOINTMENT (OUTPATIENT)
Dept: RADIOLOGY | Facility: MEDICAL CENTER | Age: 68
End: 2023-08-01
Payer: COMMERCIAL

## 2023-08-01 VITALS
DIASTOLIC BLOOD PRESSURE: 81 MMHG | HEART RATE: 84 BPM | HEIGHT: 60 IN | WEIGHT: 131 LBS | BODY MASS INDEX: 25.72 KG/M2 | SYSTOLIC BLOOD PRESSURE: 130 MMHG

## 2023-08-01 DIAGNOSIS — M79.642 BILATERAL HAND PAIN: ICD-10-CM

## 2023-08-01 DIAGNOSIS — M79.641 BILATERAL HAND PAIN: ICD-10-CM

## 2023-08-01 DIAGNOSIS — M19.041 PRIMARY OSTEOARTHRITIS OF BOTH HANDS: Primary | ICD-10-CM

## 2023-08-01 DIAGNOSIS — M19.042 PRIMARY OSTEOARTHRITIS OF BOTH HANDS: Primary | ICD-10-CM

## 2023-08-01 PROCEDURE — 73130 X-RAY EXAM OF HAND: CPT

## 2023-08-01 PROCEDURE — 99203 OFFICE O/P NEW LOW 30 MIN: CPT | Performed by: STUDENT IN AN ORGANIZED HEALTH CARE EDUCATION/TRAINING PROGRAM

## 2023-08-01 NOTE — PROGRESS NOTES
Assessment:   Diagnosis ICD-10-CM Associated Orders   1. Primary osteoarthritis of both hands  M19.041 Ambulatory Referral to PT/OT Hand Therapy    M19.042 Ambulatory Referral to Orthopedic Surgery      2. Bilateral hand pain  M79.641 XR hand 3+ vw right    M79.642 XR hand 3+ vw left     Ambulatory Referral to PT/OT Hand Therapy     Ambulatory Referral to Orthopedic Surgery        Plan:  Reviewed today's physical exam findings and x-ray findings with patient at time of visit. X-Ray of bilateral hand taken on 08/01/2023 were reviewed and showed primary osteoarthritis of multiple DIP joints. Risks and benefits of conservative and operative treatments were discussed in detail with the patient. It was explained to the patient that based upon the clinical and radiographic findings, at this point in time, this is not a surgical problem. Treatment for the above diagnoses and associated symptoms of this nature is generally conservative including a physician directed physical therapy program, anti-inflammatories, and potentially various injections. Amb referral to hand therapy to improve ROM and strengthening of bilateral hands. Amb referral to orthopedic surgery - hand specialist. She can continue NSAIDs/Tylenol as needed for pain and soreness. Patient expresses understanding and is in agreement with this treatment plan. The patient was given the opportunity to ask questions or present concerns. To do next visit:  Return if symptoms worsen or fail to improve. The above stated was discussed in layman's terms and the patient expressed understanding. All questions were answered to the patient's satisfaction.        Scribe Attestation    I,:  Alistair Garay am acting as a scribe while in the presence of the attending physician.:       I,:  Crystal Pang DO personally performed the services described in this documentation    as scribed in my presence.:         Subjective:   Jared Marcum is a 69 y.o. female who presents today for an Initial evaluation of her bilateral hand due to chronic pain. On today's presentation, the patient states that she has been experiencing worsening bilateral hand pain along the DIP joints. Right worse than left. Denies mechanism of injury or yessy trauma to warrant her symptoms. She also notes nodules along the DIP joint of her right middle and left ring fingers. She notes redness along the nodules. She denies any recent bruising, numbness, paresthesias, weakness, or feelings of instability. She denies any fevers, chills, dizziness, headaches, chest pain, shortness of breath, palpitations, abdominal pain, nausea, vomiting, diarrhea, lower extremity pain/swelling/edema. Review of systems negative unless otherwise specified in HPI  Review of Systems   Constitutional: Negative for chills and fever. HENT: Negative for ear pain and sore throat. Eyes: Negative for pain and visual disturbance. Respiratory: Negative for cough and shortness of breath. Cardiovascular: Negative for chest pain and palpitations. Gastrointestinal: Negative for abdominal pain and vomiting. Genitourinary: Negative for dysuria and hematuria. Musculoskeletal: Negative for arthralgias and back pain. Skin: Negative for color change and rash. Neurological: Negative for seizures and syncope. All other systems reviewed and are negative.       Past Medical History:   Diagnosis Date   • Acute left-sided low back pain without sciatica 6/15/2021   • CPAP (continuous positive airway pressure) dependence    • DJD (degenerative joint disease) of cervical spine    • History of COVID-19 10/03/2022   • Hyperglycemia     last assessed 16   • Hypertension    • Medullary sponge kidney    • Sleep apnea        Past Surgical History:   Procedure Laterality Date   • CATARACT EXTRACTION Left 2011   •  SECTION      unknown onset   • CYSTOSCOPY W/ URETERAL STENT PLACEMENT Right    • CYSTOSCOPY W/ URETERAL STENT REMOVAL Right 10/27/2015    right stent   • DILATION AND CURETTAGE OF UTERUS      of cervical stump unknown onset   • OOPHORECTOMY Left     unknown onset   • GA RPR UMBILICAL HRNA 5 YRS/> REDUCIBLE N/A 10/23/2018    Procedure: REPAIR HERNIA UMBILICAL;  Surgeon: Dunia Mckeon DO;  Location: [facility];  Service: General   • TOTAL ABDOMINAL HYSTERECTOMY      w/ removal of both ovaries, last assessed 8/21/14       Family History   Problem Relation Age of Onset   • Coronary artery disease Mother    • Hypertension Mother    • Stroke Mother         syndrome   • Diabetes Family    • Pancreatic cancer Father    • No Known Problems Daughter    • No Known Problems Maternal Grandmother    • No Known Problems Maternal Grandfather    • Endometrial cancer Paternal Grandmother    • Leukemia Paternal Grandfather    • Lung cancer Paternal Aunt    • Lung cancer Paternal Uncle        Social History     Occupational History   • Not on file   Tobacco Use   • Smoking status: Never   • Smokeless tobacco: Never   Vaping Use   • Vaping Use: Never used   Substance and Sexual Activity   • Alcohol use: Yes     Comment: 3x per week wine or beer   • Drug use: No   • Sexual activity: Not on file         Current Outpatient Medications:   •  ALPRAZolam (XANAX) 0.5 mg tablet, Take 1 tablet (0.5 mg total) by mouth daily, Disp: 90 tablet, Rfl: 0  •  atenolol (TENORMIN) 50 mg tablet, Take 1 tablet (50 mg total) by mouth daily, Disp: 90 tablet, Rfl: 3  •  buPROPion (WELLBUTRIN XL) 150 mg 24 hr tablet, Take 1 tablet (150 mg total) by mouth every morning, Disp: 90 tablet, Rfl: 3  •  calcium carbonate (OS-JUDY) 600 MG tablet, Take 600 mg by mouth daily, Disp: , Rfl:   •  Cholecalciferol (VITAMIN D3) 2000 units capsule, Take 5,000 Units by mouth , Disp: , Rfl:   •  lisinopril (ZESTRIL) 10 mg tablet, Take 1 tablet (10 mg total) by mouth daily, Disp: 90 tablet, Rfl: 3    No Known Allergies       Vitals:    08/01/23 1325   BP: 130/81   Pulse: 84 Objective:                    Ortho Exam  bilateral Hand -    Patient presents with no obvious anatomical deformity  Skin is warm and dry to touch with no signs of erythema, ecchymosis, or infection  No soft tissue swelling or effusion noted  Full FDS, FDP, extensor mechanisms are intact  No rotational deformity with composite finger flexion  Nodules present on DIP joint of right middle and left ring  TTP over DIP joints of fingers  Demonstrates normal wrist, elbow, and shoulder motion  Forearm compartments are soft and supple  2+ distal radial pulse with brisk capillary refill to the fingers  Radial, median, and ulnar motor and sensory distribution intact  Sensations light to touch intact distally    Diagnostics, reviewed and taken today if performed as documented: The attending physician has personally reviewed the pertinent films in PACS and interpretation is as follows:    X-Ray of bilateral hand taken on 08/01/2023 were reviewed and showed primary osteoarthritis of multiple DIP joints. Procedures, if performed today:    None performed    Portions of the record may have been created with voice recognition software. Occasional wrong word or "sound a like" substitutions may have occurred due to the inherent limitations of voice recognition software. Read the chart carefully and recognize, using context, where substitutions have occurred.

## 2023-08-04 ENCOUNTER — TELEPHONE (OUTPATIENT)
Age: 68
End: 2023-08-04

## 2023-08-04 NOTE — TELEPHONE ENCOUNTER
Patient is being referred to a orthopedics. Please schedule accordingly.     [facility]   [phone]

## 2023-09-18 DIAGNOSIS — I10 HYPERTENSION, UNSPECIFIED TYPE: ICD-10-CM

## 2023-09-18 RX ORDER — ATENOLOL 50 MG/1
50 TABLET ORAL DAILY
Qty: 90 TABLET | Refills: 0 | Status: SHIPPED | OUTPATIENT
Start: 2023-09-18

## 2023-09-28 ENCOUNTER — APPOINTMENT (OUTPATIENT)
Dept: LAB | Facility: HOSPITAL | Age: 68
End: 2023-09-28

## 2023-09-28 DIAGNOSIS — Z20.820 EXPOSURE TO VARICELLA ZOSTER VIRUS (VZV): Primary | ICD-10-CM

## 2023-09-28 LAB — VZV IGG SER QL IA: NORMAL

## 2023-09-28 PROCEDURE — 36415 COLL VENOUS BLD VENIPUNCTURE: CPT

## 2023-09-28 PROCEDURE — 86787 VARICELLA-ZOSTER ANTIBODY: CPT

## 2023-12-11 ENCOUNTER — OFFICE VISIT (OUTPATIENT)
Dept: FAMILY MEDICINE CLINIC | Facility: CLINIC | Age: 68
End: 2023-12-11
Payer: COMMERCIAL

## 2023-12-11 VITALS
TEMPERATURE: 97.4 F | OXYGEN SATURATION: 94 % | DIASTOLIC BLOOD PRESSURE: 70 MMHG | WEIGHT: 131.4 LBS | HEART RATE: 65 BPM | RESPIRATION RATE: 18 BRPM | BODY MASS INDEX: 25.8 KG/M2 | HEIGHT: 60 IN | SYSTOLIC BLOOD PRESSURE: 124 MMHG

## 2023-12-11 DIAGNOSIS — Z00.00 ANNUAL PHYSICAL EXAM: Primary | ICD-10-CM

## 2023-12-11 DIAGNOSIS — I10 HYPERTENSION, UNSPECIFIED TYPE: ICD-10-CM

## 2023-12-11 DIAGNOSIS — F41.9 ANXIETY: ICD-10-CM

## 2023-12-11 PROCEDURE — 99397 PER PM REEVAL EST PAT 65+ YR: CPT | Performed by: INTERNAL MEDICINE

## 2023-12-11 RX ORDER — ATENOLOL 50 MG/1
50 TABLET ORAL DAILY
Qty: 90 TABLET | Refills: 3 | Status: SHIPPED | OUTPATIENT
Start: 2023-12-11

## 2023-12-11 RX ORDER — RIBOFLAVIN (VITAMIN B2) 100 MG
600 TABLET ORAL 2 TIMES DAILY
COMMUNITY

## 2023-12-11 RX ORDER — ALPRAZOLAM 0.5 MG/1
0.5 TABLET ORAL DAILY
Qty: 90 TABLET | Refills: 0 | Status: SHIPPED | OUTPATIENT
Start: 2023-12-11

## 2023-12-11 NOTE — PATIENT INSTRUCTIONS
Wellness Visit for Adults   AMBULATORY CARE:   A wellness visit  is when you see your healthcare provider to get screened for health problems. Your healthcare provider will also give you advice on how to stay healthy. Write down your questions so you remember to ask them. Ask your healthcare provider how often you should have a wellness visit. What happens at a wellness visit:  Your healthcare provider will ask about your health, and your family history of health problems. This includes high blood pressure, heart disease, and cancer. He or she will ask if you have symptoms that concern you, if you smoke, and about your mood. You may also be asked about your intake of medicines, supplements, food, and alcohol. Any of the following may be done: Your weight  will be checked. Your height may also be checked so your body mass index (BMI) can be calculated. Your BMI shows if you are at a healthy weight. Your blood pressure  and heart rate will be checked. Your temperature may also be checked. Blood and urine tests  may be done. Blood tests may be done to check your cholesterol levels. Abnormal cholesterol levels increase your risk for heart disease and stroke. You may also need a blood or urine test to check for diabetes if you are at increased risk. Urine tests may be done to look for signs of an infection or kidney disease. A physical exam  includes checking your heartbeat and lungs with a stethoscope. Your healthcare provider may also check your skin to look for sun damage. Screening tests  may be recommended. A screening test is done to check for diseases that may not cause symptoms. The screening tests you may need depend on your age, gender, family history, and lifestyle habits. For example, colorectal screening may be recommended if you are 48years old or older. Screening tests you need if you are a woman:   A Pap smear  is used to screen for cervical cancer.  Pap smears are usually done every 3 to 5 years depending on your age. You may need them more often if you have had abnormal Pap smear test results in the past. Ask your healthcare provider how often you should have a Pap smear. A mammogram  is an x-ray of your breasts to screen for breast cancer. Experts recommend mammograms every 2 years starting at age 48 years. You may need a mammogram at age 52 years or younger if you have an increased risk for breast cancer. Talk to your healthcare provider about when you should start having mammograms and how often you need them. Vaccines you may need:   Get an influenza vaccine  every year. The influenza vaccine protects you from the flu. Several types of viruses cause the flu. The viruses change over time, so new vaccines are made each year. Get a tetanus-diphtheria (Td) booster vaccine  every 10 years. This vaccine protects you against tetanus and diphtheria. Tetanus is a severe infection that may cause painful muscle spasms and lockjaw. Diphtheria is a severe bacterial infection that causes a thick covering in the back of your mouth and throat. Get a human papillomavirus (HPV) vaccine  if you are female and aged 23 to 32 or male 23 to 24 and never received it. This vaccine protects you from HPV infection. HPV is the most common infection spread by sexual contact. HPV may also cause vaginal, penile, and anal cancers. Get a pneumococcal vaccine  if you are aged 72 years or older. The pneumococcal vaccine is an injection given to protect you from pneumococcal disease. Pneumococcal disease is an infection caused by pneumococcal bacteria. The infection may cause pneumonia, meningitis, or an ear infection. Get a shingles vaccine  if you are 60 or older, even if you have had shingles before. The shingles vaccine is an injection to protect you from the varicella-zoster virus. This is the same virus that causes chickenpox.  Shingles is a painful rash that develops in people who had chickenpox or have been exposed to the virus. How to eat healthy:  My Plate is a model for planning healthy meals. It shows the types and amounts of foods that should go on your plate. Fruits and vegetables make up about half of your plate, and grains and protein make up the other half. A serving of dairy is included on the side of your plate. The amount of calories and serving sizes you need depends on your age, gender, weight, and height. Examples of healthy foods are listed below:  Eat a variety of vegetables  such as dark green, red, and orange vegetables. You can also include canned vegetables low in sodium (salt) and frozen vegetables without added butter or sauces. Eat a variety of fresh fruits , canned fruit in 100% juice, frozen fruit, and dried fruit. Include whole grains. At least half of the grains you eat should be whole grains. Examples include whole-wheat bread, wheat pasta, brown rice, and whole-grain cereals such as oatmeal.    Eat a variety of protein foods such as seafood (fish and shellfish), lean meat, and poultry without skin (turkey and chicken). Examples of lean meats include pork leg, shoulder, or tenderloin, and beef round, sirloin, tenderloin, and extra lean ground beef. Other protein foods include eggs and egg substitutes, beans, peas, soy products, nuts, and seeds. Choose low-fat dairy products such as skim or 1% milk or low-fat yogurt, cheese, and cottage cheese. Limit unhealthy fats  such as butter, hard margarine, and shortening. Exercise:  Exercise at least 30 minutes per day on most days of the week. Some examples of exercise include walking, biking, dancing, and swimming. You can also fit in more physical activity by taking the stairs instead of the elevator or parking farther away from stores. Include muscle strengthening activities 2 days each week. Regular exercise provides many health benefits.  It helps you manage your weight, and decreases your risk for type 2 diabetes, heart disease, stroke, and high blood pressure. Exercise can also help improve your mood. Ask your healthcare provider about the best exercise plan for you. General health and safety guidelines:   Do not smoke. Nicotine and other chemicals in cigarettes and cigars can cause lung damage. Ask your healthcare provider for information if you currently smoke and need help to quit. E-cigarettes or smokeless tobacco still contain nicotine. Talk to your healthcare provider before you use these products. Limit alcohol. A drink of alcohol is 12 ounces of beer, 5 ounces of wine, or 1½ ounces of liquor. Lose weight, if needed. Being overweight increases your risk of certain health conditions. These include heart disease, high blood pressure, type 2 diabetes, and certain types of cancer. Protect your skin. Do not sunbathe or use tanning beds. Use sunscreen with a SPF 15 or higher. Apply sunscreen at least 15 minutes before you go outside. Reapply sunscreen every 2 hours. Wear protective clothing, hats, and sunglasses when you are outside. Drive safely. Always wear your seatbelt. Make sure everyone in your car wears a seatbelt. A seatbelt can save your life if you are in an accident. Do not use your cell phone when you are driving. This could distract you and cause an accident. Pull over if you need to make a call or send a text message. Practice safe sex. Use latex condoms if are sexually active and have more than one partner. Your healthcare provider may recommend screening tests for sexually transmitted infections (STIs). Wear helmets, lifejackets, and protective gear. Always wear a helmet when you ride a bike or motorcycle, go skiing, or play sports that could cause a head injury. Wear protective equipment when you play sports. Wear a lifejacket when you are on a boat or doing water sports.     © Copyright [employer]vez 2023 Information is for End User's use only and may not be sold, redistributed or otherwise used for commercial purposes. The above information is an  only. It is not intended as medical advice for individual conditions or treatments. Talk to your doctor, nurse or pharmacist before following any medical regimen to see if it is safe and effective for you.

## 2023-12-11 NOTE — PROGRESS NOTES
ADULT ANNUAL [address] [place] [address] PRIMARY CARE    NAME: Cooper Moy  AGE: 77 y.o. SEX: female  : 1955     DATE: 2023     Assessment and Plan:     Problem List Items Addressed This Visit          Cardiovascular and Mediastinum    Hypertension    Relevant Medications    atenolol (TENORMIN) 50 mg tablet       Other    Anxiety    Relevant Medications    ALPRAZolam (XANAX) 0.5 mg tablet    Annual physical exam - Primary   Pt continues to benefit from cpap and is compliant  Continue current rx  Seems to be better with change of time of her Wellbutrin so will continue current rx for now  Stay hydrated   Rto 6months       Immunizations and preventive care screenings were discussed with patient today. Appropriate education was printed on patient's after visit summary. Counseling:  Exercise: the importance of regular exercise/physical activity was discussed. Recommend exercise 3-5 times per week for at least 30 minutes. Depression Screening and Follow-up Plan: Patient was screened for depression during today's encounter. They screened negative with a PHQ-2 score of 2. Rto 6months   No follow-ups on file. Chief Complaint:     Chief Complaint   Patient presents with    Annual Exam     Has been having abdominal discomfort, thinks possibly another hernia? History of Present Illness:     Adult Annual Physical   Patient here for a comprehensive physical exam. The patient reports problems - Annual PE Pt generally doing ok Has had some days where she feels down but changing time has helped  . Diet and Physical Activity  Diet/Nutrition: well balanced diet. Exercise: walking.       Depression Screening  PHQ-2/9 Depression Screening    Little interest or pleasure in doing things: 1 - several days  Feeling down, depressed, or hopeless: 1 - several days  PHQ-2 Score: 2  PHQ-2 Interpretation: Negative depression screen       General Health  Sleep: gets 7-8 hours of sleep on average. Hearing: normal - bilateral.  Vision: goes for regular eye exams. Dental: regular dental visits. /GYN Health  Follows with gynecology? no   Patient is: postmenopausal  Last menstrual period: years  Contraceptive method: barrier methods. Advanced Care Planning  Do you have an advanced directive? no  Do you have a durable medical power of ? no     Review of Systems:     Review of Systems   Constitutional:  Negative for chills and fever. HENT: Negative. Eyes:  Negative for visual disturbance. Respiratory:  Negative for cough and shortness of breath. Cardiovascular:  Negative for chest pain, palpitations and leg swelling. Gastrointestinal:  Negative for abdominal distention and abdominal pain. Small ventral hernia   Genitourinary: Negative. Musculoskeletal:  Positive for arthralgias. Neurological:  Negative for dizziness, light-headedness and headaches. Psychiatric/Behavioral:  Negative for sleep disturbance. The patient is not nervous/anxious.        Past Medical History:     Past Medical History:   Diagnosis Date    Acute left-sided low back pain without sciatica 6/15/2021    CPAP (continuous positive airway pressure) dependence     DJD (degenerative joint disease) of cervical spine     History of COVID-19 10/03/2022    Hyperglycemia     last assessed 16    Hypertension     Medullary sponge kidney     Sleep apnea       Past Surgical History:     Past Surgical History:   Procedure Laterality Date    CATARACT EXTRACTION Left 2011     SECTION      unknown onset    CYSTOSCOPY W/ URETERAL STENT PLACEMENT Right     CYSTOSCOPY W/ URETERAL STENT REMOVAL Right 10/27/2015    right stent    DILATION AND CURETTAGE OF UTERUS      of cervical stump unknown onset    OOPHORECTOMY Left     unknown onset    DE RPR UMBILICAL HRNA 5 YRS/> REDUCIBLE N/A 10/23/2018    Procedure: REPAIR HERNIA UMBILICAL;  Surgeon: Toribio Khan DO;  Location: [facility];  Service: General    TOTAL ABDOMINAL HYSTERECTOMY      w/ removal of both ovaries, last assessed 8/21/14      Social History:     Social History     Socioeconomic History    Marital status:       Spouse name: None    Number of children: None    Years of education: None    Highest education level: None   Occupational History    None   Tobacco Use    Smoking status: Never    Smokeless tobacco: Never   Vaping Use    Vaping Use: Never used   Substance and Sexual Activity    Alcohol use: Yes     Comment: 3x per week wine or beer    Drug use: No    Sexual activity: None   Other Topics Concern    None   Social History Narrative    Dental care occasionally    Lives with spouse    No advance directives    No caffeine use    Sun screen worn    Uses seatbelt     Social Determinants of Health     Financial Resource Strain: Not on file   Food Insecurity: Not on file   Transportation Needs: Not on file   Physical Activity: Not on file   Stress: Not on file   Social Connections: Not on file   Intimate Partner Violence: Not on file   Housing Stability: Not on file      Family History:     Family History   Problem Relation Age of Onset    Coronary artery disease Mother     Hypertension Mother     Stroke Mother         syndrome    Diabetes Family     Pancreatic cancer Father     No Known Problems Daughter     No Known Problems Maternal Grandmother     No Known Problems Maternal Grandfather     Endometrial cancer Paternal Grandmother     Leukemia Paternal Grandfather     Lung cancer Paternal Aunt     Lung cancer Paternal Uncle       Current Medications:     Current Outpatient Medications   Medication Sig Dispense Refill    ALPRAZolam (XANAX) 0.5 mg tablet Take 1 tablet (0.5 mg total) by mouth daily 90 tablet 0    Ascorbic Acid (vitamin C) 100 MG tablet Take 600 mg by mouth 2 (two) times a day      atenolol (TENORMIN) 50 mg tablet Take 1 tablet (50 mg total) by mouth daily 90 tablet 3    buPROPion (WELLBUTRIN XL) 150 mg 24 hr tablet Take 1 tablet (150 mg total) by mouth every morning 90 tablet 3    calcium carbonate (OS-JUDY) 600 MG tablet Take 600 mg by mouth daily      Cholecalciferol (VITAMIN D3) 2000 units capsule Take 5,000 Units by mouth       lisinopril (ZESTRIL) 10 mg tablet Take 1 tablet (10 mg total) by mouth daily 90 tablet 3     No current facility-administered medications for this visit. Allergies:     No Known Allergies   Physical Exam:     /70   Pulse 65   Temp (!) 97.4 °F (36.3 °C) (Temporal)   Resp 18   Ht 5' (1.524 m)   Wt 59.6 kg (131 lb 6.4 oz)   SpO2 94%   BMI 25.66 kg/m²     Physical Exam  Vitals and nursing note reviewed. Constitutional:       General: She is not in acute distress. Appearance: Normal appearance. She is not ill-appearing, toxic-appearing or diaphoretic. HENT:      Head: Normocephalic and atraumatic. Right Ear: External ear normal.      Left Ear: External ear normal.      Nose: Nose normal.      Mouth/Throat:      Mouth: Mucous membranes are dry. Eyes:      General: No scleral icterus. Cardiovascular:      Rate and Rhythm: Normal rate and regular rhythm. Pulses: Normal pulses. Heart sounds: Normal heart sounds. Pulmonary:      Effort: Pulmonary effort is normal. No respiratory distress. Breath sounds: Normal breath sounds. No wheezing. Abdominal:      General: Bowel sounds are normal. There is no distension. Palpations: Abdomen is soft. Tenderness: There is no abdominal tenderness. Musculoskeletal:      Cervical back: Normal range of motion and neck supple. Right lower leg: No edema. Left lower leg: No edema. Lymphadenopathy:      Cervical: No cervical adenopathy. Skin:     General: Skin is warm and dry. Neurological:      General: No focal deficit present. Mental Status: She is alert and oriented to person, place, and time. Mental status is at baseline.    Psychiatric:         Mood and Affect: Mood normal. Behavior: Behavior normal.         Thought Content:  Thought content normal.         Judgment: Judgment normal.          DO Stefan Naranjo

## 2024-04-08 ENCOUNTER — HOSPITAL ENCOUNTER (EMERGENCY)
Facility: HOSPITAL | Age: 69
Discharge: HOME | End: 2024-04-08
Attending: EMERGENCY MEDICINE | Admitting: EMERGENCY MEDICINE
Payer: OTHER MISCELLANEOUS

## 2024-04-08 VITALS
OXYGEN SATURATION: 97 % | HEART RATE: 63 BPM | DIASTOLIC BLOOD PRESSURE: 69 MMHG | SYSTOLIC BLOOD PRESSURE: 149 MMHG | RESPIRATION RATE: 18 BRPM | BODY MASS INDEX: 25.66 KG/M2 | TEMPERATURE: 97.9 F | WEIGHT: 131.39 LBS

## 2024-04-08 DIAGNOSIS — S61.219A FINGER LACERATION: Primary | ICD-10-CM

## 2024-04-08 DIAGNOSIS — Z23 TETANUS-DIPHTHERIA VACCINATION ADMINISTERED AT CURRENT VISIT: ICD-10-CM

## 2024-04-08 PROCEDURE — 99284 EMERGENCY DEPT VISIT MOD MDM: CPT | Performed by: EMERGENCY MEDICINE

## 2024-04-08 PROCEDURE — 90715 TDAP VACCINE 7 YRS/> IM: CPT | Performed by: EMERGENCY MEDICINE

## 2024-04-08 RX ORDER — BACITRACIN, NEOMYCIN, POLYMYXIN B 400; 3.5; 5 [USP'U]/G; MG/G; [USP'U]/G
1 OINTMENT TOPICAL ONCE
Status: COMPLETED | OUTPATIENT
Start: 2024-04-08 | End: 2024-04-08

## 2024-04-08 RX ADMIN — BACITRACIN ZINC, NEOMYCIN, POLYMYXIN B SULFAT 1 SMALL APPLICATION: 5000; 3.5; 4 OINTMENT TOPICAL at 09:29

## 2024-04-08 RX ADMIN — TETANUS TOXOID, REDUCED DIPHTHERIA TOXOID AND ACELLULAR PERTUSSIS VACCINE, ADSORBED 0.5 ML: 5; 2.5; 8; 8; 2.5 SUSPENSION INTRAMUSCULAR at 09:26

## 2024-04-08 NOTE — ED PROVIDER NOTES
History  Chief Complaint   Patient presents with    Finger Injury     Pt cut left 5th finger on box in OR at work. 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  Did not have any contact with any bodily fluids.  Bleeding has since stopped.  Patient is unsure of her tetanus status.  Patient is right-hand dominant.  Patient is not on any anticoagulation.  Wound was cleansed.  Hold bandage.          Prior to Admission Medications   Prescriptions Last Dose Informant Patient Reported? Taking?   ALPRAZolam (XANAX) 0.5 mg tablet   No No   Sig: Take 1 tablet (0.5 mg total) by mouth daily   Ascorbic Acid (vitamin C) 100 MG tablet   Yes No   Sig: Take 600 mg by mouth 2 (two) times a day   Cholecalciferol (VITAMIN D3) 2000 units capsule  Self Yes No   Sig: Take 5,000 Units by mouth    atenolol (TENORMIN) 50 mg tablet   No No   Sig: Take 1 tablet (50 mg total) by mouth daily   buPROPion (WELLBUTRIN XL) 150 mg 24 hr tablet  Self No No   Sig: Take 1 tablet (150 mg total) by mouth every morning   calcium carbonate (OS-JUDY) 600 MG tablet  Self Yes No   Sig: Take 600 mg by mouth daily   lisinopril (ZESTRIL) 10 mg tablet  Self No No   Sig: Take 1 tablet (10 mg total) by mouth daily      Facility-Administered Medications: None       Past Medical History:   Diagnosis Date    Acute left-sided low back pain without sciatica 6/15/2021    CPAP (continuous positive airway pressure) dependence     DJD (degenerative joint disease) of cervical spine     History of COVID-19 10/03/2022    Hyperglycemia     last assessed 16    Hypertension     Medullary sponge kidney     Sleep apnea        Past Surgical History:   Procedure Laterality Date    CATARACT EXTRACTION Left 2011     SECTION      unknown onset    CYSTOSCOPY W/ URETERAL STENT PLACEMENT  Right     CYSTOSCOPY W/ URETERAL STENT REMOVAL Right 10/27/2015    right stent    DILATION AND CURETTAGE OF UTERUS      of cervical stump unknown onset    OOPHORECTOMY Left     unknown onset    NC RPR UMBILICAL HRNA 5 YRS/> REDUCIBLE N/A 10/23/2018    Procedure: REPAIR HERNIA UMBILICAL;  Surgeon: Williams Hernandez DO;  Location: [facility];  Service: General    TOTAL ABDOMINAL HYSTERECTOMY      w/ removal of both ovaries, last assessed 8/21/14       Family History   Problem Relation Age of Onset    Coronary artery disease Mother     Hypertension Mother     Stroke Mother         syndrome    Diabetes Family     Pancreatic cancer Father     No Known Problems Daughter     No Known Problems Maternal Grandmother     No Known Problems Maternal Grandfather     Endometrial cancer Paternal Grandmother     Leukemia Paternal Grandfather     Lung cancer Paternal Aunt     Lung cancer Paternal Uncle      I have reviewed and agree with the history as documented.    E-Cigarette/Vaping    E-Cigarette Use Never User      E-Cigarette/Vaping Substances    Nicotine No     THC No     CBD No     Flavoring No     Other No     Unknown No      Social History     Tobacco Use    Smoking status: Never    Smokeless tobacco: Never   Vaping Use    Vaping status: Never Used   Substance Use Topics    Alcohol use: Yes     Comment: 3x per week wine or beer    Drug use: No       Review of Systems   Constitutional:  Negative for appetite change, fatigue, fever and unexpected weight change.   HENT:  Negative for congestion, rhinorrhea and sore throat.    Eyes:  Negative for visual disturbance.   Respiratory:  Negative for cough, chest tightness, shortness of breath and wheezing.    Cardiovascular:  Negative for chest pain, palpitations and leg swelling.   Gastrointestinal:  Negative for abdominal pain, constipation, diarrhea, nausea and vomiting.   Genitourinary:  Negative for difficulty urinating, dysuria, frequency, menstrual problem, pelvic pain, vaginal  bleeding and vaginal discharge.   Musculoskeletal:  Negative for back pain and neck pain.   Skin:  Positive for wound.   Neurological:  Negative for dizziness, syncope, light-headedness and headaches.   Psychiatric/Behavioral:  Negative for sleep disturbance.    All other systems reviewed and are negative.      Physical Exam  Physical Exam  Vitals and nursing note reviewed.   Constitutional:       General: She is not in acute distress.     Appearance: Normal appearance. She is well-developed and normal weight. She is not ill-appearing, toxic-appearing or diaphoretic.   HENT:      Head: Normocephalic and atraumatic.      Nose: Nose normal.      Mouth/Throat:      Mouth: Mucous membranes are moist.      Pharynx: Oropharynx is clear.   Eyes:      General: No scleral icterus.     Extraocular Movements: Extraocular movements intact.      Conjunctiva/sclera: Conjunctivae normal.   Cardiovascular:      Rate and Rhythm: Normal rate and regular rhythm.      Pulses: Normal pulses.      Heart sounds: Normal heart sounds. No murmur heard.     No friction rub. No gallop.   Pulmonary:      Effort: Pulmonary effort is normal. No respiratory distress.      Breath sounds: Normal breath sounds. No wheezing or rales.   Abdominal:      Palpations: Abdomen is soft. There is no mass.      Tenderness: There is no abdominal tenderness. There is no right CVA tenderness, left CVA tenderness, guarding or rebound.      Hernia: No hernia is present.   Musculoskeletal:         General: Signs of injury present. Normal range of motion.      Cervical back: Normal range of motion and neck supple. No rigidity or tenderness.      Right lower leg: No edema.      Left lower leg: No edema.      Comments: Small superficial laceration approximately 1.5 cm lateral aspect of left pinky finger.   Lymphadenopathy:      Cervical: No cervical adenopathy.   Skin:     General: Skin is warm and dry.      Capillary Refill: Capillary refill takes less than 2 seconds.       Coloration: Skin is not jaundiced or pale.      Findings: No lesion or rash.   Neurological:      General: No focal deficit present.      Mental Status: She is alert and oriented to person, place, and time.   Psychiatric:         Mood and Affect: Mood normal.         Behavior: Behavior normal.         Vital Signs  ED Triage Vitals [04/08/24 0914]   Temperature Pulse Respirations Blood Pressure SpO2   97.9 °F (36.6 °C) 63 18 149/69 97 %      Temp Source Heart Rate Source Patient Position - Orthostatic VS BP Location FiO2 (%)   Temporal Monitor Sitting Right arm --      Pain Score       4           Vitals:    04/08/24 0914   BP: 149/69   Pulse: 63   Patient Position - Orthostatic VS: Sitting         Visual Acuity      ED Medications  Medications   neomycin-bacitracin-polymyxin b (NEOSPORIN) ointment 1 small application (has no administration in time range)   tetanus-diphtheria-acellular pertussis (BOOSTRIX) IM injection 0.5 mL (0.5 mL Intramuscular Given 4/8/24 0926)       Diagnostic Studies  Results Reviewed       None                   No orders to display              Procedures  Procedures         ED Course                               SBIRT 20yo+      Flowsheet Row Most Recent Value   Initial Alcohol Screen: US AUDIT-C     1. How often do you have a drink containing alcohol? 0 Filed at: 04/08/2024 0917   2. How many drinks containing alcohol do you have on a typical day you are drinking?  0 Filed at: 04/08/2024 0917   3b. FEMALE Any Age, or MALE 65+: How often do you have 4 or more drinks on one occassion? 0 Filed at: 04/08/2024 0917   Audit-C Score 0 Filed at: 04/08/2024 0917   CHARLY: How many times in the past year have you...    Used an illegal drug or used a prescription medication for non-medical reasons? Never Filed at: 04/08/2024 0917                      Medical Decision Making  68-year-old female who stained a laceration to her left pinky finger at work while opening a cardboard box.    Problems  Addressed:  Finger laceration: acute illness or injury  Tetanus-diphtheria vaccination administered at current visit: acute illness or injury    Amount and/or Complexity of Data Reviewed  External Data Reviewed: notes.    Risk  OTC drugs.  Prescription drug management.  Risk Details: Tetanus updated.  Wound cleansed and bandaged.             Disposition  Final diagnoses:   Finger laceration   Tetanus-diphtheria vaccination administered at current visit     Time reflects when diagnosis was documented in both MDM as applicable and the Disposition within this note       Time User Action Codes Description Comment    4/8/2024  9:22 AM Gordon Durham Add [S61.219A] Finger laceration     4/8/2024  9:23 AM Gordon Durham Add [Z23] Tetanus-diphtheria vaccination administered at current visit           ED Disposition       ED Disposition   Discharge    Condition   Stable    Date/Time   Mon Apr 8, 2024 0921    Comment   Tracee Valencia discharge to home/self care.                   Follow-up Information       Follow up With Specialties Details Why Contact Info    Kamilla Vyas,  Internal Medicine, Hospice Services Schedule an appointment as soon as possible for a visit   [address] [id number]  [phone]              Current Discharge Medication List        CONTINUE these medications which have NOT CHANGED    Details   ALPRAZolam (XANAX) 0.5 mg tablet Take 1 tablet (0.5 mg total) by mouth daily  Qty: 90 tablet, Refills: 0    Associated Diagnoses: Anxiety      Ascorbic Acid (vitamin C) 100 MG tablet Take 600 mg by mouth 2 (two) times a day      atenolol (TENORMIN) 50 mg tablet Take 1 tablet (50 mg total) by mouth daily  Qty: 90 tablet, Refills: 3    Associated Diagnoses: Hypertension, unspecified type      buPROPion (WELLBUTRIN XL) 150 mg 24 hr tablet Take 1 tablet (150 mg total) by mouth every morning  Qty: 90 tablet, Refills: 3    Associated Diagnoses: Anxiety      calcium carbonate (OS-JUDY) 600 MG  tablet Take 600 mg by mouth daily      Cholecalciferol (VITAMIN D3) 2000 units capsule Take 5,000 Units by mouth       lisinopril (ZESTRIL) 10 mg tablet Take 1 tablet (10 mg total) by mouth daily  Qty: 90 tablet, Refills: 3    Associated Diagnoses: Hypertension, unspecified type             No discharge procedures on file.    PDMP Review         Value Time User    PDMP Reviewed  Yes 12/11/2023  4:11 PM Kamilla Vyas DO            ED Provider  Electronically Signed by             Gordon Durham DO  04/08/24 0926       Gordon Durham DO  04/08/24 0929

## 2024-04-12 ENCOUNTER — OFFICE VISIT (OUTPATIENT)
Dept: SURGERY | Facility: CLINIC | Age: 69
End: 2024-04-12

## 2024-04-12 VITALS
HEIGHT: 60 IN | HEART RATE: 61 BPM | TEMPERATURE: 97.4 F | BODY MASS INDEX: 24.54 KG/M2 | WEIGHT: 125 LBS | DIASTOLIC BLOOD PRESSURE: 62 MMHG | SYSTOLIC BLOOD PRESSURE: 132 MMHG | OXYGEN SATURATION: 97 %

## 2024-04-12 DIAGNOSIS — R10.84 GENERALIZED ABDOMINAL PAIN: Primary | ICD-10-CM

## 2024-04-13 NOTE — ASSESSMENT & PLAN NOTE
Generalized abdominal pain mostly infraumbilical and more right lateral abdominal pain noted.  No obvious recurrence of the umbilical hernia that was repaired primarily 6 years ago.  Patient notices this pain sometimes during work but mostly after work.  Unclear if this is some sort of additional ventral hernia versus muscle strain.  Patient is rather worried given the recent passing of a colleague.  I feel the best way to just overall evaluate this particular problem and symptoms in the face of previous hernia repair is for her to undergo a CT scan of the abdomen pelvis.  This will be ordered.  I will call patient with the results to determine treatment plan and next steps.

## 2024-04-16 DIAGNOSIS — F41.9 ANXIETY: ICD-10-CM

## 2024-04-17 RX ORDER — BUPROPION HYDROCHLORIDE 150 MG/1
150 TABLET ORAL DAILY
Qty: 90 TABLET | Refills: 0 | Status: SHIPPED | OUTPATIENT
Start: 2024-04-17

## 2024-04-18 ENCOUNTER — HOSPITAL ENCOUNTER (OUTPATIENT)
Dept: CT IMAGING | Facility: HOSPITAL | Age: 69
Discharge: HOME | End: 2024-04-18
Attending: SURGERY
Payer: COMMERCIAL

## 2024-04-18 DIAGNOSIS — R10.84 GENERALIZED ABDOMINAL PAIN: ICD-10-CM

## 2024-04-18 PROCEDURE — 74177 CT ABD & PELVIS W/CONTRAST: CPT

## 2024-04-18 RX ADMIN — IOHEXOL 100 ML: 350 INJECTION, SOLUTION INTRAVENOUS at 16:28

## 2024-05-05 DIAGNOSIS — I10 HYPERTENSION, UNSPECIFIED TYPE: ICD-10-CM

## 2024-05-07 RX ORDER — LISINOPRIL 10 MG/1
10 TABLET ORAL DAILY
Qty: 90 TABLET | Refills: 1 | Status: SHIPPED | OUTPATIENT
Start: 2024-05-07

## 2024-05-23 ENCOUNTER — TELEPHONE (OUTPATIENT)
Age: 69
End: 2024-05-23

## 2024-05-23 DIAGNOSIS — E55.9 VITAMIN D DEFICIENCY: Primary | ICD-10-CM

## 2024-05-23 DIAGNOSIS — Z00.00 ENCOUNTER FOR PREVENTIVE CARE: ICD-10-CM

## 2024-05-23 DIAGNOSIS — Z86.39 HISTORY OF HYPERLIPIDEMIA: ICD-10-CM

## 2024-05-23 NOTE — TELEPHONE ENCOUNTER
Please advise: patient has annual physical 6/10, she is asking if there are any bloodwork requests per Kamilla Vyas. She would like to get this done prior to her appointment.  Patient mentioned that blood glucose and A1C levels should at least be checked.     May return phone call on home phone and mentions she can answer after 4pm or leave a message.

## 2024-06-06 ENCOUNTER — APPOINTMENT (OUTPATIENT)
Dept: LAB | Facility: HOSPITAL | Age: 69
End: 2024-06-06
Attending: INTERNAL MEDICINE
Payer: COMMERCIAL

## 2024-06-06 DIAGNOSIS — Z86.39 HISTORY OF HYPERLIPIDEMIA: ICD-10-CM

## 2024-06-06 DIAGNOSIS — E55.9 VITAMIN D DEFICIENCY: ICD-10-CM

## 2024-06-06 DIAGNOSIS — Z00.00 ENCOUNTER FOR PREVENTIVE CARE: ICD-10-CM

## 2024-06-06 LAB
25(OH)D3 SERPL-MCNC: 89.8 NG/ML (ref 30–100)
ALBUMIN SERPL BCP-MCNC: 4.1 G/DL (ref 3.5–5)
ALP SERPL-CCNC: 85 U/L (ref 34–104)
ALT SERPL W P-5'-P-CCNC: 17 U/L (ref 7–52)
ANION GAP SERPL CALCULATED.3IONS-SCNC: 7 MMOL/L (ref 4–13)
AST SERPL W P-5'-P-CCNC: 19 U/L (ref 13–39)
BILIRUB SERPL-MCNC: 0.68 MG/DL (ref 0.2–1)
BUN SERPL-MCNC: 23 MG/DL (ref 5–25)
CALCIUM SERPL-MCNC: 9.3 MG/DL (ref 8.4–10.2)
CHLORIDE SERPL-SCNC: 107 MMOL/L (ref 96–108)
CHOLEST SERPL-MCNC: 201 MG/DL
CO2 SERPL-SCNC: 25 MMOL/L (ref 21–32)
CREAT SERPL-MCNC: 0.88 MG/DL (ref 0.6–1.3)
ERYTHROCYTE [DISTWIDTH] IN BLOOD BY AUTOMATED COUNT: 11.6 % (ref 11.6–15.1)
EST. AVERAGE GLUCOSE BLD GHB EST-MCNC: 117 MG/DL
GFR SERPL CREATININE-BSD FRML MDRD: 66 ML/MIN/1.73SQ M
GLUCOSE P FAST SERPL-MCNC: 131 MG/DL (ref 65–99)
HBA1C MFR BLD: 5.7 %
HCT VFR BLD AUTO: 40 % (ref 34.8–46.1)
HDLC SERPL-MCNC: 60 MG/DL
HGB BLD-MCNC: 13.7 G/DL (ref 11.5–15.4)
LDLC SERPL CALC-MCNC: 123 MG/DL (ref 0–100)
MCH RBC QN AUTO: 32 PG (ref 26.8–34.3)
MCHC RBC AUTO-ENTMCNC: 34.3 G/DL (ref 31.4–37.4)
MCV RBC AUTO: 94 FL (ref 82–98)
NONHDLC SERPL-MCNC: 141 MG/DL
PLATELET # BLD AUTO: 199 THOUSANDS/UL (ref 149–390)
PMV BLD AUTO: 10.3 FL (ref 8.9–12.7)
POTASSIUM SERPL-SCNC: 4.4 MMOL/L (ref 3.5–5.3)
PROT SERPL-MCNC: 6.5 G/DL (ref 6.4–8.4)
RBC # BLD AUTO: 4.28 MILLION/UL (ref 3.81–5.12)
SODIUM SERPL-SCNC: 139 MMOL/L (ref 135–147)
TRIGL SERPL-MCNC: 91 MG/DL
WBC # BLD AUTO: 5.51 THOUSAND/UL (ref 4.31–10.16)

## 2024-06-06 PROCEDURE — 36415 COLL VENOUS BLD VENIPUNCTURE: CPT

## 2024-06-06 PROCEDURE — 82306 VITAMIN D 25 HYDROXY: CPT

## 2024-06-06 PROCEDURE — 85027 COMPLETE CBC AUTOMATED: CPT

## 2024-06-06 PROCEDURE — 83036 HEMOGLOBIN GLYCOSYLATED A1C: CPT

## 2024-06-06 PROCEDURE — 80053 COMPREHEN METABOLIC PANEL: CPT

## 2024-06-06 PROCEDURE — 80061 LIPID PANEL: CPT

## 2024-06-10 ENCOUNTER — OFFICE VISIT (OUTPATIENT)
Dept: FAMILY MEDICINE CLINIC | Facility: CLINIC | Age: 69
End: 2024-06-10
Payer: COMMERCIAL

## 2024-06-10 VITALS
TEMPERATURE: 98 F | DIASTOLIC BLOOD PRESSURE: 70 MMHG | OXYGEN SATURATION: 97 % | BODY MASS INDEX: 24.54 KG/M2 | WEIGHT: 125 LBS | HEIGHT: 60 IN | RESPIRATION RATE: 18 BRPM | HEART RATE: 67 BPM | SYSTOLIC BLOOD PRESSURE: 124 MMHG

## 2024-06-10 DIAGNOSIS — I10 HYPERTENSION, UNSPECIFIED TYPE: Primary | ICD-10-CM

## 2024-06-10 DIAGNOSIS — Z12.31 ENCOUNTER FOR SCREENING MAMMOGRAM FOR BREAST CANCER: ICD-10-CM

## 2024-06-10 DIAGNOSIS — G47.33 OSA (OBSTRUCTIVE SLEEP APNEA): ICD-10-CM

## 2024-06-10 DIAGNOSIS — F41.9 ANXIETY: ICD-10-CM

## 2024-06-10 PROBLEM — R10.84 GENERALIZED ABDOMINAL PAIN: Status: RESOLVED | Noted: 2024-04-12 | Resolved: 2024-06-10

## 2024-06-10 PROCEDURE — 99214 OFFICE O/P EST MOD 30 MIN: CPT | Performed by: INTERNAL MEDICINE

## 2024-06-10 RX ORDER — ALPRAZOLAM 0.5 MG/1
0.5 TABLET ORAL AS NEEDED
Qty: 30 TABLET | Refills: 0 | Status: SHIPPED | OUTPATIENT
Start: 2024-06-10

## 2024-06-10 NOTE — PROGRESS NOTES
Ambulatory Visit  Name: Tracee Valencia      : 1955      MRN: [id number]  Encounter Provider: Kamilla Vyas DO  Encounter Date: 6/10/2024   Encounter department: [facility]    Assessment & Plan   1. Hypertension, unspecified type  Lo sodium diet Stay hydrated Continue current rx   2. Encounter for screening mammogram for breast cancer  -     Mammo screening bilateral w 3d & cad; Future; Expected date: 06/10/2024  Pt will schedule Mammo   3. Anxiety  -     ALPRAZolam (XANAX) 0.5 mg tablet; Take 1 tablet (0.5 mg total) by mouth if needed for sleep  Pt takes as needed and it does help   4. GABRIELLE (obstructive sleep apnea)  Pt compliant with cpap and has sleep followup in Fall       Depression Screening and Follow-up Plan: Patient was screened for depression during today's encounter. They screened negative with a PHQ-2 score of 0.  Rto 6months   History of Present Illness     HPI  Pt doing ok She is going on a cruise next week and still working as that helps to keep her occupied No acute issues She is managing ok She has sleep f/u in Fall and is very compliant with cpap which is beneficial No chest pain or sob No falls No limiting pain Sleep is better with cpap She is still working as she does not like to not be busy now that she is alone She did get recent labs and wants to review   Review of Systems   Constitutional:  Negative for activity change, chills and fever.   HENT: Negative.     Eyes:  Negative for visual disturbance.   Respiratory: Negative.     Cardiovascular: Negative.    Gastrointestinal: Negative.    Genitourinary: Negative.    Musculoskeletal:  Positive for arthralgias.   Neurological: Negative.    Psychiatric/Behavioral:  Negative for sleep disturbance. The patient is not nervous/anxious.      Past Medical History:   Diagnosis Date   • Acute left-sided low back pain without sciatica 6/15/2021   • CPAP (continuous positive airway pressure) dependence    • DJD (degenerative joint  disease) of cervical spine    • History of COVID-19 10/03/2022   • Hyperglycemia     last assessed 16   • Hypertension    • Medullary sponge kidney    • Sleep apnea      Past Surgical History:   Procedure Laterality Date   • CATARACT EXTRACTION Left 2011   •  SECTION      unknown onset   • CYSTOSCOPY W/ URETERAL STENT PLACEMENT Right    • CYSTOSCOPY W/ URETERAL STENT REMOVAL Right 10/27/2015    right stent   • DILATION AND CURETTAGE OF UTERUS      of cervical stump unknown onset   • OOPHORECTOMY Left     unknown onset   • AR RPR UMBILICAL HRNA 5 YRS/> REDUCIBLE N/A 10/23/2018    Procedure: REPAIR HERNIA UMBILICAL;  Surgeon: Williams Hernandez DO;  Location: [facility];  Service: General   • TOTAL ABDOMINAL HYSTERECTOMY      w/ removal of both ovaries, last assessed 14     Social History     Socioeconomic History   • Marital status:      Spouse name: Not on file   • Number of children: Not on file   • Years of education: Not on file   • Highest education level: Not on file   Occupational History   • Not on file   Tobacco Use   • Smoking status: Never   • Smokeless tobacco: Never   Vaping Use   • Vaping status: Never Used   Substance and Sexual Activity   • Alcohol use: Yes     Comment: 3x per week wine or beer   • Drug use: No   • Sexual activity: Not on file   Other Topics Concern   • Not on file   Social History Narrative    Dental care occasionally    Lives with spouse    No advance directives    No caffeine use    Sun screen worn    Uses seatbelt     Social Determinants of Health     Financial Resource Strain: Not on file   Food Insecurity: Not on file   Transportation Needs: Not on file   Physical Activity: Not on file   Stress: Not on file   Social Connections: Not on file   Intimate Partner Violence: Not on file   Housing Stability: Not on file     No Known Allergies    Objective     /70   Pulse 67   Temp 98 °F (36.7 °C) (Temporal)   Resp 18   Ht 5' (1.524 m)   Wt 56.7 kg  (125 lb)   SpO2 97%   BMI 24.41 kg/m²     Physical Exam  Vitals and nursing note reviewed.   Constitutional:       General: She is not in acute distress.     Appearance: Normal appearance. She is not ill-appearing, toxic-appearing or diaphoretic.   HENT:      Head: Normocephalic and atraumatic.      Right Ear: Tympanic membrane, ear canal and external ear normal. There is no impacted cerumen.      Left Ear: Tympanic membrane, ear canal and external ear normal. There is no impacted cerumen.      Nose: Nose normal.      Mouth/Throat:      Mouth: Mucous membranes are moist.   Eyes:      General: No scleral icterus.     Extraocular Movements: Extraocular movements intact.      Conjunctiva/sclera: Conjunctivae normal.      Pupils: Pupils are equal, round, and reactive to light.   Cardiovascular:      Rate and Rhythm: Normal rate and regular rhythm.      Pulses: Normal pulses.      Heart sounds: Normal heart sounds. No murmur heard.  Pulmonary:      Effort: Pulmonary effort is normal. No respiratory distress.      Breath sounds: Normal breath sounds. No wheezing.   Abdominal:      General: Bowel sounds are normal. There is no distension.      Palpations: Abdomen is soft.      Tenderness: There is no abdominal tenderness.   Musculoskeletal:      Cervical back: Normal range of motion and neck supple.      Right lower leg: No edema.      Left lower leg: No edema.   Lymphadenopathy:      Cervical: No cervical adenopathy.   Skin:     General: Skin is warm and dry.      Coloration: Skin is not jaundiced or pale.   Neurological:      General: No focal deficit present.      Mental Status: She is alert and oriented to person, place, and time. Mental status is at baseline.      Cranial Nerves: No cranial nerve deficit.   Psychiatric:         Mood and Affect: Mood normal.         Behavior: Behavior normal.         Thought Content: Thought content normal.         Judgment: Judgment normal.     Administrative Statements

## 2024-07-15 ENCOUNTER — TELEPHONE (OUTPATIENT)
Age: 69
End: 2024-07-15

## 2024-07-15 DIAGNOSIS — J06.9 UPPER RESPIRATORY TRACT INFECTION, UNSPECIFIED TYPE: Primary | ICD-10-CM

## 2024-07-15 RX ORDER — AZITHROMYCIN 250 MG/1
TABLET, FILM COATED ORAL
Qty: 6 TABLET | Refills: 0 | Status: SHIPPED | OUTPATIENT
Start: 2024-07-15 | End: 2024-07-20

## 2024-07-15 NOTE — TELEPHONE ENCOUNTER
Joanna Sharp called in stating she is experiencing cough, congestion, sinus symptoms, raspy voice, yellow mucous.  She does have taste and smell also denies fever.    She is requesting if a ABX can be called into [facility].  She is currently at work.  She could come in to be seen but is at work until 3:30 today.  Please call her at home humber if needed.

## 2024-08-15 DIAGNOSIS — F41.9 ANXIETY: ICD-10-CM

## 2024-08-15 RX ORDER — ALPRAZOLAM 0.5 MG
0.5 TABLET ORAL AS NEEDED
Qty: 30 TABLET | Refills: 0 | Status: SHIPPED | OUTPATIENT
Start: 2024-08-15

## 2024-08-15 NOTE — TELEPHONE ENCOUNTER
Reason for call:   [x] Refill   [] Prior Auth  [] Other:     Office:   [x] PCP/Provider -Kamilla Vyas/Eduardo RANKIN   [] Specialty/Provider -     Medication: Xanax    Dose/Frequency: 0.5 mg    Quantity: #30    Pharmacy: [facility]    Does the patient have enough for 3 days?   [] Yes   [x] No - Send as HP to POD

## 2024-10-09 DIAGNOSIS — Z12.31 ENCOUNTER FOR SCREENING MAMMOGRAM FOR MALIGNANT NEOPLASM OF BREAST: Primary | ICD-10-CM

## 2024-10-18 DIAGNOSIS — F41.9 ANXIETY: ICD-10-CM

## 2024-10-18 RX ORDER — ALPRAZOLAM 0.5 MG
0.5 TABLET ORAL AS NEEDED
Qty: 30 TABLET | Refills: 0 | Status: SHIPPED | OUTPATIENT
Start: 2024-10-18

## 2024-10-18 RX ORDER — BUPROPION HYDROCHLORIDE 150 MG/1
150 TABLET ORAL DAILY
Qty: 90 TABLET | Refills: 1 | Status: SHIPPED | OUTPATIENT
Start: 2024-10-18

## 2024-10-18 NOTE — TELEPHONE ENCOUNTER
Reason for call:   [x] Refill   [] Prior Auth  [] Other:     Office:   [x] PCP/Provider - vito   [] Specialty/Provider -     Medication:     buPROPion (WELLBUTRIN XL) 150 mg 24 hr tablet        ALPRAZolam (XANAX) 0.5 mg tablet       Dose/Frequency:     Sig: TAKE ONE TABLET BY MOUTH EVERY MORNING      Sig: Take 1 tablet (0.5 mg total) by mouth if needed for sleep    Quantity:   90  30    Pharmacy:   [facility] - [place], [place] - [address]  [address], [place] [address]  Phone: [phone]  Fax: [phone]        Does the patient have enough for 3 days?   [] Yes   [x] No - Send as HP to POD

## 2024-10-21 ENCOUNTER — TELEPHONE (OUTPATIENT)
Age: 69
End: 2024-10-21

## 2024-10-21 NOTE — TELEPHONE ENCOUNTER
VM for patient to call to schedule recall colonoscopy with Dr. Arvizu.    12/22/14 [employer] 10 yr recall

## 2024-10-24 NOTE — TELEPHONE ENCOUNTER
Tracee called back to check on status of her RX.  [facility].  Can previous rx sent be Dc'd since not picked up and a new RX for med be sent to [facility] so patient can get rx?    Patient is currently at work and would appreciate an update.

## 2024-10-24 NOTE — TELEPHONE ENCOUNTER
Patient states pharmacy did not give her Xanax and are telling her they haven't received it yet. She is requesting clinical give a call to pharmacy to find out why or where it is. She requests a call back to update, okay to leave voicemail.

## 2024-10-29 DIAGNOSIS — F41.9 ANXIETY: ICD-10-CM

## 2024-10-29 RX ORDER — ALPRAZOLAM 0.5 MG
TABLET ORAL
Qty: 30 TABLET | Refills: 0 | Status: SHIPPED | OUTPATIENT
Start: 2024-10-29

## 2024-11-05 ENCOUNTER — HOSPITAL ENCOUNTER (OUTPATIENT)
Dept: MAMMOGRAPHY | Facility: HOSPITAL | Age: 69
Discharge: HOME | End: 2024-11-05
Payer: COMMERCIAL

## 2024-11-05 VITALS — BODY MASS INDEX: 24.54 KG/M2 | WEIGHT: 125 LBS | HEIGHT: 60 IN

## 2024-11-05 DIAGNOSIS — Z12.31 ENCOUNTER FOR SCREENING MAMMOGRAM FOR MALIGNANT NEOPLASM OF BREAST: ICD-10-CM

## 2024-11-05 PROCEDURE — 77067 SCR MAMMO BI INCL CAD: CPT

## 2024-11-05 PROCEDURE — 77063 BREAST TOMOSYNTHESIS BI: CPT

## 2024-11-11 ENCOUNTER — OFFICE VISIT (OUTPATIENT)
Dept: SLEEP CENTER | Facility: CLINIC | Age: 69
End: 2024-11-11
Payer: COMMERCIAL

## 2024-11-11 ENCOUNTER — TELEPHONE (OUTPATIENT)
Dept: FAMILY MEDICINE CLINIC | Facility: CLINIC | Age: 69
End: 2024-11-11

## 2024-11-11 VITALS
RESPIRATION RATE: 16 BRPM | HEIGHT: 60 IN | SYSTOLIC BLOOD PRESSURE: 140 MMHG | BODY MASS INDEX: 24.46 KG/M2 | HEART RATE: 65 BPM | WEIGHT: 124.6 LBS | OXYGEN SATURATION: 95 % | TEMPERATURE: 97.8 F | DIASTOLIC BLOOD PRESSURE: 74 MMHG

## 2024-11-11 DIAGNOSIS — I10 ESSENTIAL HYPERTENSION: ICD-10-CM

## 2024-11-11 DIAGNOSIS — F51.12 INSUFFICIENT SLEEP SYNDROME: ICD-10-CM

## 2024-11-11 DIAGNOSIS — G47.9 SLEEP DISTURBANCE: ICD-10-CM

## 2024-11-11 DIAGNOSIS — K21.9 GASTROESOPHAGEAL REFLUX DISEASE, UNSPECIFIED WHETHER ESOPHAGITIS PRESENT: ICD-10-CM

## 2024-11-11 DIAGNOSIS — F41.9 ANXIETY: ICD-10-CM

## 2024-11-11 DIAGNOSIS — G47.33 OSA (OBSTRUCTIVE SLEEP APNEA): Primary | ICD-10-CM

## 2024-11-11 DIAGNOSIS — R68.2 DRY MOUTH: ICD-10-CM

## 2024-11-11 PROCEDURE — 99214 OFFICE O/P EST MOD 30 MIN: CPT | Performed by: INTERNAL MEDICINE

## 2024-11-11 NOTE — TELEPHONE ENCOUNTER
ATC pt and no answer ----- Message from Kamilla Vyas DO sent at 11/8/2024  3:58 PM EST -----  Mammo stable recheck one year

## 2024-11-11 NOTE — PROGRESS NOTES
Follow-Up Note - Sleep Center   Tracee Valencia  69 y.o. female  :1955  MRN:[id number]  DOS:2024    CC: I saw this patient for follow-up in clinic today for Sleep disordered breathing, Coexisting Sleep and Medical Problems.  Patient received ot a refurbished dream Station Version 1 machine from [employer] over a year ago.. Interval changes: She was unable to get a replacement machine because she is not yet eligible    Results of  of a diagnostic study in :  AHI of 20 per hour with minimum oxygen saturation of 86%.  prior studies in 2015:  A retitration study in  demonstrated sleep disordered breathing was adequately remediated with nasal CPAP at 8 cm H2O.     PFSH, Problem List, Medications & Allergies were reviewed in EMR.   She  has a past medical history of Acute left-sided low back pain without sciatica (6/15/2021), CPAP (continuous positive airway pressure) dependence, DJD (degenerative joint disease) of cervical spine, History of COVID-19 (10/03/2022), Hyperglycemia, Hypertension, Medullary sponge kidney, and Sleep apnea.    She has a current medication list which includes the following prescription(s): alprazolam, vitamin c, atenolol, bupropion, calcium carbonate, vitamin d3, and lisinopril.    PHYSIOLOGICAL DATA REVIEW : Using PAP > 4 hours/night 93%. Estimated SUSANA 3.2/hour with pressure of 9cm H2O@90th/95th percentile; use is limited by  .  INTERPRETATION: Compliance is excellent; Pressure setting is:within target range; ;   SUBJECTIVE: With respect to use of PAP, Tracee  is experiencing minimal adverse effects: mask leaks .; dry mouth is improved.  She derives benefit..  Feels satisfied with sleep and daytime function.   Sleep Routine: Tracee reports getting 6-7 hrs sleep on workdays and 8 to 10 hours on days off.; she has no difficulty initiating or maintaining sleep .  At times may struggle to fall back asleep because of stress and anxiety.  She arises needing an alarm and feels  more refreshed since on Rx.Tracee]denies excessive daytime sleepiness,.  She rated herself at Total score: 5 /24 on the [place] Sleepiness Scale.   Other issues: None reported.     Habits: Reports that she has never smoked. She has never used smokeless tobacco.,  Reports current alcohol use.,  Reports no history of drug use., Caffeine use:limited until  ; Exercise routine: regular being physically active in her job..      ROS: Significant for weight has been stable.  She reported no nasal, respiratory or cardiac symptoms.  Anxiety is controlled on bupropion and uses alprazolam as needed.    EXAM: /74 (BP Location: Left arm, Patient Position: Sitting, Cuff Size: Standard)   Pulse 65   Temp 97.8 °F (36.6 °C) (Temporal)   Resp 16   Ht 5' (1.524 m)   Wt 56.5 kg (124 lb 9.6 oz)   SpO2 95%   BMI 24.33 kg/m²     Wt Readings from Last 3 Encounters:   11/11/24 56.5 kg (124 lb 9.6 oz)   11/05/24 56.7 kg (125 lb)   06/10/24 56.7 kg (125 lb)      Patient is well groomed; well appearing.   CNS: Alert, orientated, speech clear & coherent  Psych: cooperative and in no distress. Mental state: Appears somewhat anxious.  H&N: EOMI; NC/AT: No facial pressure marks, no rashes.    Skin/Extrem: col & hydration normal; no edema  Resp: Respiratory effort is normal  Physical findings otherwise essentially unchanged from previous.    IMPRESSION: Problem List Items & Comorbidities Addressed this Visit    1. GABRIELLE (obstructive sleep apnea)  PAP DME Resupply/Reorder      2. Sleep disturbance        3. Insufficient sleep syndrome        4. Dry mouth        5. Anxiety        6. Essential hypertension        7. Gastroesophageal reflux disease, unspecified whether esophagitis present            PLAN:  I reviewed results of prior studies and physiologic data with the patient.   I discussed treatment options with risks and benefits.  Treatment with  PAP is medically necessary and Tracee is agreable to continue use.   Care of equipment,  "methods to improve comfort using PAP and importance of compliance with therapy were discussed.  Pressure setting: continue 8-12 cmH2O using a nasal interface.    Rx provided to replace supplies and Care coordinated with DME provider.   I also advised allowing sufficient opportunity for sleep.  Follow-up is advised in 1 year or sooner if needed to monitor progress, compliance and to adjust therapy.     Thank you for allowing me to participate in the care of this patient.    Sincerely,     Authenticated electronically on 11/11/24   Board Certified Specialist     Portions of the record may have been created with voice recognition software. Occasional wrong word or \"sound a like\" substitutions may have occurred due to the inherent limitations of voice recognition software. There may also be notations and random deletions of words or characters from malfunctioning software. Read the chart carefully and recognize, using context, where substitutions/deletions have occurred.    "

## 2024-11-12 ENCOUNTER — TELEPHONE (OUTPATIENT)
Dept: SLEEP CENTER | Facility: CLINIC | Age: 69
End: 2024-11-12

## 2024-11-26 DIAGNOSIS — I10 HYPERTENSION, UNSPECIFIED TYPE: ICD-10-CM

## 2024-11-26 RX ORDER — ATENOLOL 50 MG/1
50 TABLET ORAL DAILY
Qty: 90 TABLET | Refills: 3 | Status: SHIPPED | OUTPATIENT
Start: 2024-11-26

## 2024-11-26 NOTE — TELEPHONE ENCOUNTER
Tracee called in concerned about the medication being processed/signed/sent today..    If at all possible please have provider authorize rx.  She is concerned about running out on holiday week.  [facility]    Thank you

## 2024-12-09 ENCOUNTER — OFFICE VISIT (OUTPATIENT)
Dept: FAMILY MEDICINE CLINIC | Facility: CLINIC | Age: 69
End: 2024-12-09
Payer: COMMERCIAL

## 2024-12-09 VITALS
OXYGEN SATURATION: 97 % | RESPIRATION RATE: 18 BRPM | TEMPERATURE: 97.3 F | DIASTOLIC BLOOD PRESSURE: 74 MMHG | HEIGHT: 60 IN | HEART RATE: 67 BPM | BODY MASS INDEX: 24.23 KG/M2 | SYSTOLIC BLOOD PRESSURE: 136 MMHG | WEIGHT: 123.4 LBS

## 2024-12-09 DIAGNOSIS — F41.9 ANXIETY: ICD-10-CM

## 2024-12-09 DIAGNOSIS — Z00.00 ANNUAL PHYSICAL EXAM: Primary | ICD-10-CM

## 2024-12-09 DIAGNOSIS — R22.42 SUBCUTANEOUS MASS OF LEFT FOOT: ICD-10-CM

## 2024-12-09 DIAGNOSIS — I10 HYPERTENSION, UNSPECIFIED TYPE: ICD-10-CM

## 2024-12-09 DIAGNOSIS — E55.9 VITAMIN D DEFICIENCY: ICD-10-CM

## 2024-12-09 DIAGNOSIS — Z23 IMMUNIZATION DUE: ICD-10-CM

## 2024-12-09 PROCEDURE — 90677 PCV20 VACCINE IM: CPT | Performed by: INTERNAL MEDICINE

## 2024-12-09 PROCEDURE — 90471 IMMUNIZATION ADMIN: CPT | Performed by: INTERNAL MEDICINE

## 2024-12-09 PROCEDURE — 99397 PER PM REEVAL EST PAT 65+ YR: CPT | Performed by: INTERNAL MEDICINE

## 2024-12-09 RX ORDER — BUPROPION HYDROCHLORIDE 150 MG/1
150 TABLET ORAL DAILY
Qty: 90 TABLET | Refills: 3 | Status: SHIPPED | OUTPATIENT
Start: 2024-12-09

## 2024-12-09 RX ORDER — LISINOPRIL 10 MG/1
10 TABLET ORAL DAILY
Qty: 90 TABLET | Refills: 1 | Status: SHIPPED | OUTPATIENT
Start: 2024-12-09

## 2024-12-09 NOTE — ASSESSMENT & PLAN NOTE
Orders:    Lipid panel; Future    CBC and Platelet; Future    Comprehensive metabolic panel; Future    Hemoglobin A1C; Future    Vitamin D 25 hydroxy; Future  Rx FBW due in spring  Stay hydrated

## 2024-12-09 NOTE — ASSESSMENT & PLAN NOTE
Orders:    lisinopril (ZESTRIL) 10 mg tablet; Take 1 tablet (10 mg total) by mouth daily    Comprehensive metabolic panel; Future  Due for refills So sent 12 month rx to [facility]

## 2024-12-09 NOTE — ASSESSMENT & PLAN NOTE
Orders:    buPROPion (WELLBUTRIN XL) 150 mg 24 hr tablet; Take 1 tablet (150 mg total) by mouth daily  Refill sent Stable on rx

## 2024-12-09 NOTE — PROGRESS NOTES
Adult Annual Physical  Name: Tracee Valencia      : 1955      MRN: [id number]  Encounter Provider: Kamilla Vyas DO  Encounter Date: 2024   Encounter department: [facility]    Assessment & Plan  Immunization due    Orders:    Pneumococcal Conjugate Vaccine 20-valent (Pcv20)    Annual physical exam    Orders:    Lipid panel; Future    CBC and Platelet; Future    Comprehensive metabolic panel; Future    Hemoglobin A1C; Future    Vitamin D 25 hydroxy; Future  Rx FBW due in spring  Stay hydrated   Anxiety    Orders:    buPROPion (WELLBUTRIN XL) 150 mg 24 hr tablet; Take 1 tablet (150 mg total) by mouth daily  Refill sent Stable on rx   Hypertension, unspecified type    Orders:    lisinopril (ZESTRIL) 10 mg tablet; Take 1 tablet (10 mg total) by mouth daily    Comprehensive metabolic panel; Future  Due for refills So sent 12 month rx to [facility]  Vitamin D deficiency    Orders:    Vitamin D 25 hydroxy; Future  Labs due in spring Continue supplement   Immunizations and preventive care screenings were discussed with patient today. Appropriate education was printed on patient's after visit summary.    Counseling:  Exercise: the importance of regular exercise/physical activity was discussed. Recommend exercise 3-5 times per week for at least 30 minutes.       Depression Screening and Follow-up Plan: Patient was screened for depression during today's encounter. They screened negative with a PHQ-2 score of 0.    Rto 6months     History of Present Illness   Pt doing ok overall She denies any recent illness Would like Prevnar today Still working and that helps for diversion and keeps her busy/out of the house     Adult Annual Physical:  Patient presents for annual physical.     Diet and Physical Activity:  - Diet/Nutrition: well balanced diet.  - Exercise: walking.    Depression Screening:  - PHQ-2 Score: 0    General Health:  - Sleep: 4-6 hours of sleep on average.  - Hearing: normal hearing  bilateral ears.  - Vision: no vision problems and goes for regular eye exams.  - Dental: regular dental visits.    /GYN Health:  - Follows with GYN: yes.   - Menopause: postmenopausal.   - Contraception: menopause.      Advanced Care Planning:  - Has an advanced directive?: no    - Has a durable medical POA?: no    - ACP document given to patient?: no      Review of Systems   Constitutional:  Negative for chills and fever.   HENT: Negative.     Eyes:  Negative for visual disturbance.   Respiratory:  Negative for cough and shortness of breath.    Cardiovascular: Negative.    Gastrointestinal: Negative.    Genitourinary: Negative.    Musculoskeletal: Negative.    Neurological:  Negative for dizziness, light-headedness and headaches.   Psychiatric/Behavioral:  Negative for sleep disturbance. The patient is not nervous/anxious.          Objective   /74   Pulse 67   Temp (!) 97.3 °F (36.3 °C) (Temporal)   Resp 18   Ht 5' (1.524 m)   Wt 56 kg (123 lb 6.4 oz)   SpO2 97%   BMI 24.10 kg/m²     Physical Exam  Vitals and nursing note reviewed.   Constitutional:       General: She is not in acute distress.     Appearance: Normal appearance. She is not ill-appearing, toxic-appearing or diaphoretic.   HENT:      Head: Normocephalic and atraumatic.      Right Ear: External ear normal.      Left Ear: External ear normal.      Nose: Nose normal.      Mouth/Throat:      Mouth: Mucous membranes are moist.   Eyes:      General: No scleral icterus.     Extraocular Movements: Extraocular movements intact.      Conjunctiva/sclera: Conjunctivae normal.      Pupils: Pupils are equal, round, and reactive to light.   Cardiovascular:      Rate and Rhythm: Normal rate and regular rhythm.      Pulses: Normal pulses.      Heart sounds: Normal heart sounds.   Pulmonary:      Effort: Pulmonary effort is normal. No respiratory distress.      Breath sounds: Normal breath sounds. No wheezing.   Abdominal:      General: Bowel sounds are  normal. There is no distension.      Palpations: Abdomen is soft.      Tenderness: There is no abdominal tenderness.   Musculoskeletal:      Cervical back: Normal range of motion and neck supple.      Right lower leg: No edema.      Left lower leg: No edema.   Lymphadenopathy:      Cervical: No cervical adenopathy.   Skin:     General: Skin is warm and dry.      Coloration: Skin is not jaundiced or pale.   Neurological:      General: No focal deficit present.      Mental Status: She is alert and oriented to person, place, and time. Mental status is at baseline.      Cranial Nerves: No cranial nerve deficit.   Psychiatric:         Mood and Affect: Mood normal.         Behavior: Behavior normal.         Thought Content: Thought content normal.         Judgment: Judgment normal.

## 2024-12-18 ENCOUNTER — HOSPITAL ENCOUNTER (OUTPATIENT)
Dept: RADIOLOGY | Facility: HOSPITAL | Age: 69
Discharge: HOME | End: 2024-12-18
Payer: COMMERCIAL

## 2024-12-18 DIAGNOSIS — R22.42 SUBCUTANEOUS MASS OF LEFT FOOT: ICD-10-CM

## 2024-12-18 PROCEDURE — 73630 X-RAY EXAM OF FOOT: CPT

## 2024-12-21 ENCOUNTER — RESULTS FOLLOW-UP (OUTPATIENT)
Dept: FAMILY MEDICINE CLINIC | Facility: CLINIC | Age: 69
End: 2024-12-21

## 2024-12-23 NOTE — TELEPHONE ENCOUNTER
A.  Relayed results to (patient/patient representative as listed on communication consent form) as per provider message. Patient/Patient Representative expressed understanding and had the following question(s):   Pt understood results. She just had a question about the lump itself on her foot and should there be any concern about it? Please advise.

## 2025-01-07 NOTE — PROGRESS NOTES
Assessment/Plan:    Generalized abdominal pain  Generalized abdominal pain mostly infraumbilical and more right lateral abdominal pain noted.  No obvious recurrence of the umbilical hernia that was repaired primarily 6 years ago.  Patient notices this pain sometimes during work but mostly after work.  Unclear if this is some sort of additional ventral hernia versus muscle strain.  Patient is rather worried given the recent passing of a colleague.  I feel the best way to just overall evaluate this particular problem and symptoms in the face of previous hernia repair is for her to undergo a CT scan of the abdomen pelvis.  This will be ordered.  I will call patient with the results to determine treatment plan and next steps.     Diagnoses and all orders for this visit:    Generalized abdominal pain  -     CT abdomen pelvis w contrast; Future          Notes:    Recent PCP office note dated December 11, 2023 was personally viewed by me.    Blood work/labs:    Recent blood work including hemoglobin A1c, lipid panel, and CMP, all dated April 8, 2023 were personally reviewed reviewed.    Subjective:      Patient ID: Tracee Valencia is a 68 y.o. female.    68-year-old female with known hypertension, hyperglycemia, DJD, history of umbilical hernia repair primarily in 2018, presents today in consultation for evaluation of new onset abdominal pain.  Patient has been experiencing some lower abdominal pain which is below her umbilicus in addition to some right-sided abdominal pain.  She does experience this occasionally during work but is mostly seems to be after work.  The pain does not radiate and is more localized.  She is unclear if it is the strain of work or if she has a recurrent hernia.  She did see her PCP who felt that she may have a ventral hernia.  Patient does not notice any bulges.  She has no nausea vomiting.  No fevers or chills.  No changes in bowel bladder habits.  Tolerates regular diet.  Patient is recently  unfortunately experienced a loss of a friend and colleague.  This  colleague apparently suffered complications from sepsis which could have been due to bowel perforation for hernia as per the patient understanding.        The following portions of the patient's history were reviewed and updated as appropriate: She  has a past medical history of Acute left-sided low back pain without sciatica (6/15/2021), CPAP (continuous positive airway pressure) dependence, DJD (degenerative joint disease) of cervical spine, History of COVID-19 (10/03/2022), Hyperglycemia, Hypertension, Medullary sponge kidney, and Sleep apnea.  She   Patient Active Problem List    Diagnosis Date Noted    Generalized abdominal pain 2024    GABRIELLE (obstructive sleep apnea) 2023    Annual physical exam 10/05/2021    Anxiety 2018    Hypertension 2018    Nephrolithiasis 10/12/2015    Esophageal reflux 2012    Osteoporosis 2012     She  has a past surgical history that includes Cataract extraction (Left, 2011);  section; Cystoscopy w/ ureteral stent placement (Right); Cystoscopy w/ ureteral stent removal (Right, 10/27/2015); Dilation and curettage of uterus; Oophorectomy (Left); Total abdominal hysterectomy; and pr rpr umbilical hrna 5 yrs/> reducible (N/A, 10/23/2018).  Her family history includes Coronary artery disease in her mother; Diabetes in her family; Endometrial cancer in her paternal grandmother; Hypertension in her mother; Leukemia in her paternal grandfather; Lung cancer in her paternal aunt and paternal uncle; No Known Problems in her daughter, maternal grandfather, and maternal grandmother; Pancreatic cancer in her father; Stroke in her mother.  She  reports that she has never smoked. She has never used smokeless tobacco. She reports current alcohol use. She reports that she does not use drugs.  Current Outpatient Medications   Medication Sig Dispense Refill    ALPRAZolam (XANAX) 0.5 mg tablet  Take 1 tablet (0.5 mg total) by mouth daily 90 tablet 0    Ascorbic Acid (vitamin C) 100 MG tablet Take 600 mg by mouth 2 (two) times a day      atenolol (TENORMIN) 50 mg tablet Take 1 tablet (50 mg total) by mouth daily 90 tablet 3    buPROPion (WELLBUTRIN XL) 150 mg 24 hr tablet Take 1 tablet (150 mg total) by mouth every morning 90 tablet 3    calcium carbonate (OS-JUDY) 600 MG tablet Take 600 mg by mouth daily      Cholecalciferol (VITAMIN D3) 2000 units capsule Take 5,000 Units by mouth       lisinopril (ZESTRIL) 10 mg tablet Take 1 tablet (10 mg total) by mouth daily 90 tablet 3     No current facility-administered medications for this visit.     She has No Known Allergies..    Review of Systems      Review of systems completed, all negative except as above HPI.    Objective:      /62 (BP Location: Left arm, Patient Position: Sitting, Cuff Size: Standard)   Pulse 61   Temp (!) 97.4 °F (36.3 °C) (Temporal)   Ht 5' (1.524 m)   Wt 56.7 kg (125 lb)   SpO2 97%   BMI 24.41 kg/m²          Physical Exam  Vitals reviewed.   Constitutional:       General: She is not in acute distress.     Appearance: Normal appearance. She is not ill-appearing, toxic-appearing or diaphoretic.   HENT:      Head: Normocephalic and atraumatic.      Right Ear: External ear normal.      Left Ear: External ear normal.   Eyes:      General: No scleral icterus.        Right eye: No discharge.         Left eye: No discharge.   Cardiovascular:      Rate and Rhythm: Normal rate and regular rhythm.      Heart sounds: Normal heart sounds.      No friction rub. No gallop.   Pulmonary:      Effort: Pulmonary effort is normal. No respiratory distress.      Breath sounds: Normal breath sounds. No stridor. No wheezing.   Abdominal:      General: There is no distension.      Palpations: Abdomen is soft. There is no mass.      Tenderness: There is abdominal tenderness (mild tenderness in the infraumbilical region along previous abdominal scar in  addition to some mild tenderness in the right side of the abdomen more lateral.). There is no guarding or rebound.      Hernia: No hernia is present.   Musculoskeletal:         General: No swelling, deformity or signs of injury. Normal range of motion.      Cervical back: Normal range of motion.      Right lower leg: No edema.      Left lower leg: No edema.   Skin:     General: Skin is warm.      Coloration: Skin is not jaundiced or pale.      Findings: No bruising or erythema.   Neurological:      General: No focal deficit present.      Mental Status: She is alert and oriented to person, place, and time.      Cranial Nerves: No cranial nerve deficit.   Psychiatric:         Mood and Affect: Mood normal.         Behavior: Behavior normal.         Thought Content: Thought content normal.         Judgment: Judgment normal.            8

## 2025-01-15 DIAGNOSIS — F41.9 ANXIETY: ICD-10-CM

## 2025-01-15 RX ORDER — ALPRAZOLAM 0.5 MG
0.5 TABLET ORAL
Qty: 30 TABLET | Refills: 0 | Status: SHIPPED | OUTPATIENT
Start: 2025-01-15

## 2025-03-21 DIAGNOSIS — F41.9 ANXIETY: ICD-10-CM

## 2025-03-21 RX ORDER — ALPRAZOLAM 0.5 MG
0.5 TABLET ORAL
Qty: 30 TABLET | Refills: 0 | Status: SHIPPED | OUTPATIENT
Start: 2025-03-21

## 2025-03-21 NOTE — TELEPHONE ENCOUNTER
Medication: ALPRAZolam (XANAX) 0.5 mg tablet     Dose/Frequency: Take 1 tablet (0.5 mg total) by mouth daily at bedtime as needed for anxiety     Quantity:  30 tablet     Pharmacy: [facility] - [place], [place] - [address] [id number]     Office:   [x] PCP/Provider -   [] Speciality/Provider -     Does the patient have enough for 3 days?   [x] Yes   [] No - Send as HP to POD

## 2025-03-27 ENCOUNTER — OFFICE VISIT (OUTPATIENT)
Dept: FAMILY MEDICINE CLINIC | Facility: CLINIC | Age: 70
End: 2025-03-27
Payer: COMMERCIAL

## 2025-03-27 VITALS
HEART RATE: 64 BPM | DIASTOLIC BLOOD PRESSURE: 82 MMHG | BODY MASS INDEX: 24.39 KG/M2 | HEIGHT: 60 IN | TEMPERATURE: 97.7 F | SYSTOLIC BLOOD PRESSURE: 148 MMHG | OXYGEN SATURATION: 95 % | RESPIRATION RATE: 18 BRPM | WEIGHT: 124.2 LBS

## 2025-03-27 DIAGNOSIS — R42 DIZZINESS: Primary | ICD-10-CM

## 2025-03-27 PROCEDURE — 99214 OFFICE O/P EST MOD 30 MIN: CPT | Performed by: INTERNAL MEDICINE

## 2025-03-27 NOTE — PROGRESS NOTES
Name: Tracee Valencia      : 1955      MRN: [id number]  Encounter Provider: Kamilla Vyas DO  Encounter Date: 3/27/2025   Encounter department: [facility]  :  Assessment & Plan  Dizziness    Orders:    MRI brain w wo contrast; Future    ECG 12 lead; Future    Comprehensive metabolic panel; Future    Magnesium; Future  Stay hydrated  Bp check next week Increase Lisinopril to 20mg daily Lo sodium diet  She has eye exam next week          History of Present Illness   HPI  Pt has had dizziness for almost 2 weeks No recent illness No chest pain She is unsure of cause but feels unsteady on her feet She is working but she is concerned about ongoing sxs Not sure it is vertigo No uri sxs She is drinking water more She has eye exam next week No n/v No allergy sxs Bp has been a bit higher She has a B cuff but not sure it is working correctly  Review of Systems   Constitutional:  Negative for chills and fever.   HENT: Negative.     Eyes:  Positive for visual disturbance.   Cardiovascular:  Negative for chest pain.   Musculoskeletal:  Positive for arthralgias.   Neurological:  Positive for dizziness.   Psychiatric/Behavioral:  Negative for sleep disturbance. The patient is not nervous/anxious.      Past Medical History:   Diagnosis Date    Acute left-sided low back pain without sciatica 6/15/2021    CPAP (continuous positive airway pressure) dependence     DJD (degenerative joint disease) of cervical spine     History of COVID-19 10/03/2022    Hyperglycemia     last assessed 16    Hypertension     Medullary sponge kidney     Sleep apnea      Past Surgical History:   Procedure Laterality Date    CATARACT EXTRACTION Left 2011     SECTION      unknown onset    CYSTOSCOPY W/ URETERAL STENT PLACEMENT Right     CYSTOSCOPY W/ URETERAL STENT REMOVAL Right 10/27/2015    right stent    DILATION AND CURETTAGE OF UTERUS      of cervical stump unknown onset    OOPHORECTOMY Left     unknown onset    NM  RPR UMBILICAL HRNA 5 YRS/> REDUCIBLE N/A 10/23/2018    Procedure: REPAIR HERNIA UMBILICAL;  Surgeon: Williams Hernandez DO;  Location: [facility];  Service: General    TOTAL ABDOMINAL HYSTERECTOMY      w/ removal of both ovaries, last assessed 8/21/14     Social History     Socioeconomic History    Marital status:      Spouse name: Not on file    Number of children: Not on file    Years of education: Not on file    Highest education level: Not on file   Occupational History    Not on file   Tobacco Use    Smoking status: Never    Smokeless tobacco: Never   Vaping Use    Vaping status: Never Used   Substance and Sexual Activity    Alcohol use: Yes     Comment: 3x per week wine or beer    Drug use: No    Sexual activity: Not on file   Other Topics Concern    Not on file   Social History Narrative    Dental care occasionally    Lives with spouse    No advance directives    No caffeine use    Sun screen worn    Uses seatbelt     Social Drivers of Health     Financial Resource Strain: Not on file   Food Insecurity: Not on file   Transportation Needs: Not on file   Physical Activity: Not on file   Stress: Not on file   Social Connections: Not on file   Intimate Partner Violence: Not on file   Housing Stability: Not on file     No Known Allergies    Objective   /82   Pulse 64   Temp 97.7 °F (36.5 °C) (Temporal)   Resp 18   Ht 5' (1.524 m)   Wt 56.3 kg (124 lb 3.2 oz)   SpO2 95%   BMI 24.26 kg/m²      Physical Exam  Vitals and nursing note reviewed.   Constitutional:       General: She is not in acute distress.     Appearance: Normal appearance. She is not ill-appearing, toxic-appearing or diaphoretic.   HENT:      Head: Normocephalic and atraumatic.      Right Ear: External ear normal.      Left Ear: External ear normal.      Nose: Nose normal.      Mouth/Throat:      Mouth: Mucous membranes are moist.   Eyes:      General: No scleral icterus.  Cardiovascular:      Rate and Rhythm: Normal rate and regular  rhythm.      Pulses: Normal pulses.      Heart sounds: Normal heart sounds.   Pulmonary:      Effort: Pulmonary effort is normal. No respiratory distress.      Breath sounds: Normal breath sounds. No wheezing.   Abdominal:      General: Bowel sounds are normal.      Palpations: Abdomen is soft.   Musculoskeletal:      Cervical back: Normal range of motion and neck supple.      Right lower leg: No edema.      Left lower leg: No edema.   Lymphadenopathy:      Cervical: No cervical adenopathy.   Skin:     General: Skin is warm and dry.      Coloration: Skin is not jaundiced or pale.   Neurological:      General: No focal deficit present.      Mental Status: She is alert and oriented to person, place, and time. Mental status is at baseline.      Cranial Nerves: No cranial nerve deficit.      Sensory: No sensory deficit.   Psychiatric:         Mood and Affect: Mood normal.         Behavior: Behavior normal.         Thought Content: Thought content normal.         Judgment: Judgment normal.

## 2025-04-01 ENCOUNTER — TELEPHONE (OUTPATIENT)
Dept: FAMILY MEDICINE CLINIC | Facility: CLINIC | Age: 70
End: 2025-04-01

## 2025-04-01 ENCOUNTER — DOCUMENTATION (OUTPATIENT)
Dept: FAMILY MEDICINE CLINIC | Facility: CLINIC | Age: 70
End: 2025-04-01

## 2025-04-01 VITALS — DIASTOLIC BLOOD PRESSURE: 72 MMHG | SYSTOLIC BLOOD PRESSURE: 110 MMHG

## 2025-04-01 DIAGNOSIS — I10 HYPERTENSION, UNSPECIFIED TYPE: Primary | ICD-10-CM

## 2025-04-01 RX ORDER — LISINOPRIL 20 MG/1
20 TABLET ORAL DAILY
Qty: 90 TABLET | Refills: 3 | Status: SHIPPED | OUTPATIENT
Start: 2025-04-01

## 2025-04-01 NOTE — TELEPHONE ENCOUNTER
Patient was told to take 20 mg of lisinopril, she was taking 2 of her 10 mg. She will need a refill for the 20 mg sent to [facility] for #90.

## 2025-04-08 ENCOUNTER — HOSPITAL ENCOUNTER (OUTPATIENT)
Dept: MRI IMAGING | Facility: HOSPITAL | Age: 70
Discharge: HOME | End: 2025-04-08
Attending: INTERNAL MEDICINE
Payer: COMMERCIAL

## 2025-04-08 ENCOUNTER — RESULTS FOLLOW-UP (OUTPATIENT)
Dept: FAMILY MEDICINE CLINIC | Facility: CLINIC | Age: 70
End: 2025-04-08

## 2025-04-08 ENCOUNTER — OFFICE VISIT (OUTPATIENT)
Dept: LAB | Facility: HOSPITAL | Age: 70
End: 2025-04-08
Attending: INTERNAL MEDICINE
Payer: COMMERCIAL

## 2025-04-08 DIAGNOSIS — R42 DIZZINESS: ICD-10-CM

## 2025-04-08 LAB
ATRIAL RATE: 50 BPM
P AXIS: 11 DEGREES
PR INTERVAL: 160 MS
QRS AXIS: 36 DEGREES
QRSD INTERVAL: 78 MS
QT INTERVAL: 412 MS
QTC INTERVAL: 376 MS
T WAVE AXIS: 43 DEGREES
VENTRICULAR RATE: 50 BPM

## 2025-04-08 PROCEDURE — 93005 ELECTROCARDIOGRAM TRACING: CPT

## 2025-04-08 PROCEDURE — 70553 MRI BRAIN STEM W/O & W/DYE: CPT

## 2025-04-08 PROCEDURE — A9585 GADOBUTROL INJECTION: HCPCS | Performed by: INTERNAL MEDICINE

## 2025-04-08 PROCEDURE — 93010 ELECTROCARDIOGRAM REPORT: CPT | Performed by: INTERNAL MEDICINE

## 2025-04-08 RX ORDER — GADOBUTROL 604.72 MG/ML
5 INJECTION INTRAVENOUS
Status: COMPLETED | OUTPATIENT
Start: 2025-04-08 | End: 2025-04-08

## 2025-04-08 RX ADMIN — GADOBUTROL 5 ML: 604.72 INJECTION INTRAVENOUS at 09:17

## 2025-04-23 ENCOUNTER — TELEPHONE (OUTPATIENT)
Age: 70
End: 2025-04-23

## 2025-04-23 ENCOUNTER — APPOINTMENT (OUTPATIENT)
Dept: LAB | Facility: HOSPITAL | Age: 70
End: 2025-04-23
Payer: COMMERCIAL

## 2025-04-23 ENCOUNTER — APPOINTMENT (OUTPATIENT)
Dept: LAB | Facility: HOSPITAL | Age: 70
End: 2025-04-23
Attending: PREVENTIVE MEDICINE
Payer: COMMERCIAL

## 2025-04-23 DIAGNOSIS — I10 HYPERTENSION, UNSPECIFIED TYPE: ICD-10-CM

## 2025-04-23 DIAGNOSIS — R42 DIZZINESS: ICD-10-CM

## 2025-04-23 DIAGNOSIS — E55.9 VITAMIN D DEFICIENCY: ICD-10-CM

## 2025-04-23 DIAGNOSIS — Z00.00 ANNUAL PHYSICAL EXAM: ICD-10-CM

## 2025-04-23 DIAGNOSIS — Z00.8 HEALTH EXAMINATION IN POPULATION SURVEY: ICD-10-CM

## 2025-04-23 DIAGNOSIS — F41.9 ANXIETY: ICD-10-CM

## 2025-04-23 DIAGNOSIS — R00.1 BRADYCARDIA: Primary | ICD-10-CM

## 2025-04-23 LAB
25(OH)D3 SERPL-MCNC: 82.9 NG/ML (ref 30–100)
ALBUMIN SERPL BCG-MCNC: 4.1 G/DL (ref 3.5–5)
ALP SERPL-CCNC: 96 U/L (ref 34–104)
ALT SERPL W P-5'-P-CCNC: 16 U/L (ref 7–52)
ANION GAP SERPL CALCULATED.3IONS-SCNC: 8 MMOL/L (ref 4–13)
AST SERPL W P-5'-P-CCNC: 18 U/L (ref 13–39)
BILIRUB SERPL-MCNC: 0.42 MG/DL (ref 0.2–1)
BUN SERPL-MCNC: 22 MG/DL (ref 5–25)
CALCIUM SERPL-MCNC: 8.9 MG/DL (ref 8.4–10.2)
CHLORIDE SERPL-SCNC: 105 MMOL/L (ref 96–108)
CHOLEST SERPL-MCNC: 192 MG/DL (ref ?–200)
CO2 SERPL-SCNC: 25 MMOL/L (ref 21–32)
CREAT SERPL-MCNC: 0.86 MG/DL (ref 0.6–1.3)
ERYTHROCYTE [DISTWIDTH] IN BLOOD BY AUTOMATED COUNT: 11.4 % (ref 11.6–15.1)
EST. AVERAGE GLUCOSE BLD GHB EST-MCNC: 120 MG/DL
GFR SERPL CREATININE-BSD FRML MDRD: 68 ML/MIN/1.73SQ M
GLUCOSE P FAST SERPL-MCNC: 132 MG/DL (ref 65–99)
HBA1C MFR BLD: 5.8 %
HCT VFR BLD AUTO: 39.7 % (ref 34.8–46.1)
HDLC SERPL-MCNC: 51 MG/DL
HGB BLD-MCNC: 13.6 G/DL (ref 11.5–15.4)
LDLC SERPL CALC-MCNC: 107 MG/DL (ref 0–100)
MAGNESIUM SERPL-MCNC: 1.8 MG/DL (ref 1.9–2.7)
MCH RBC QN AUTO: 32.5 PG (ref 26.8–34.3)
MCHC RBC AUTO-ENTMCNC: 34.3 G/DL (ref 31.4–37.4)
MCV RBC AUTO: 95 FL (ref 82–98)
NONHDLC SERPL-MCNC: 141 MG/DL
PLATELET # BLD AUTO: 178 THOUSANDS/UL (ref 149–390)
PMV BLD AUTO: 11 FL (ref 8.9–12.7)
POTASSIUM SERPL-SCNC: 4.3 MMOL/L (ref 3.5–5.3)
PROT SERPL-MCNC: 6.7 G/DL (ref 6.4–8.4)
RBC # BLD AUTO: 4.18 MILLION/UL (ref 3.81–5.12)
SODIUM SERPL-SCNC: 138 MMOL/L (ref 135–147)
TRIGL SERPL-MCNC: 168 MG/DL (ref ?–150)
WBC # BLD AUTO: 4.68 THOUSAND/UL (ref 4.31–10.16)

## 2025-04-23 PROCEDURE — 80061 LIPID PANEL: CPT

## 2025-04-23 PROCEDURE — 85027 COMPLETE CBC AUTOMATED: CPT

## 2025-04-23 PROCEDURE — 82306 VITAMIN D 25 HYDROXY: CPT

## 2025-04-23 PROCEDURE — 83036 HEMOGLOBIN GLYCOSYLATED A1C: CPT

## 2025-04-23 PROCEDURE — 80053 COMPREHEN METABOLIC PANEL: CPT

## 2025-04-23 PROCEDURE — 36415 COLL VENOUS BLD VENIPUNCTURE: CPT

## 2025-04-23 PROCEDURE — 83735 ASSAY OF MAGNESIUM: CPT

## 2025-04-23 RX ORDER — BUPROPION HYDROCHLORIDE 150 MG/1
150 TABLET ORAL DAILY
Qty: 90 TABLET | Refills: 1 | Status: SHIPPED | OUTPATIENT
Start: 2025-04-23

## 2025-04-23 NOTE — TELEPHONE ENCOUNTER
Patient returned call. Advised of Dr. Vyas's message regarding the ECG test.     Patient will reduce medication and schedule appt with cardio

## 2025-04-23 NOTE — TELEPHONE ENCOUNTER
Patient called back stating she takes the 50mg tablets and would like to know should she cut the tablet in half to make 25mg or should a new script be sent? Please advise and notify patient

## 2025-04-23 NOTE — TELEPHONE ENCOUNTER
Chief complaint:   Chief Complaint   Patient presents with   • Sore Throat     HISTORY OF PRESENT ILLNESS     HPI:Sonido Lea is a 10 year old year old female who presents to the clinic with sore throat. She is accompanied today by her mother. Symptoms began 2 days ago and also include cough, fatigue and headache. She feels they have improved since onset. She denies congestion, runny nose, ear pain, abdominal pain or diarrhea. No home treatment. They deny specific exposure to strep throat or influenza but states that many of her classmates are ill at school. According to [facility] she has not received routine childhood immunizations. They deny any other concerns today.    PAST MEDICAL, FAMILY AND SOCIAL HISTORY     Medications:  No current outpatient prescriptions on file.     No current facility-administered medications for this visit.        Allergies:  ALLERGIES:  No Known Allergies    Past Medical  History/Surgeries:  History reviewed. No pertinent past medical history.    History reviewed. No pertinent surgical history.    Family History:  Family History   Problem Relation Age of Onset   • Asthma Brother        Social History:  Social History   Substance Use Topics   • Smoking status: Never Smoker   • Smokeless tobacco: Never Used   • Alcohol use Not on file       REVIEW OF SYSTEMS     Review of Systems  A review of systems was performed and findings relevant to this complaint are noted in the HPI.     PHYSICAL EXAM     Physical Exam     Vital Signs:    Vitals:    01/17/18 1522   BP: 102/62   Pulse: 80   Resp: 16   Temp: 98.5 °F (36.9 °C)   TempSrc: Oral   SpO2: 97%   Weight: 37.6 kg   Height: 5' (1.524 m)     General:  Well developed, well nourished female in no apparent distress.  Neurologic:  Alert and oriented x 3. Cranial nerves II-XII grossly intact. Gait is normal.    Integumentary:  Warm. Dry. Pink. No rashes.   HENT:  Normocephalic. Atraumatic. Ears - external auditory canals are mildly erythematous.  LEFT VM TO RETURN CALL.   Tympanic membranes are pearly gray with normal cone of light bilaterally. No erythema, bulging, retraction, or effusion bilaterally. Nose  - nasal turbinates are pink and moist. Septum is midline. Mouth  - pharynx is mildly erythematous and moist without exudate. Tonsils non-enlarged and without exudate or erythema. Uvula midline. Dentition in good repair.  Neck:  Supple without lymphadenopathy. Normal range of motion. No masses. Trachea midline.  Respiratory:  Normal respiratory effort.  Lungs clear to auscultation bilaterally.    Cardiovascular:   Regular rate and rhythm. No murmurs, rubs or gallops.  Psychiatric: Cooperative. Appropriate mood and affect. Normal judgment. Speech and behavior age appropriate.     Rapid Strep: Negative    ASSESSMENT/PLAN     (J02.9,  B34.9) Pharyngitis with viral syndrome  (primary encounter diagnosis)    Plan: Discussed exam findings and rapid strep results with the patient's mother. Rapid step today is negative. It will be sent for culture and the patient's mother will be contacted with results when available. I discussed supportive care measures including salt water gargles, Magic mouthwash, or Tylenol/ibuprofen as needed for pain and fever. Recommended follow-up with new or worsening symptoms. The patient left the clinic in no acute distress. The patient's mother verbalized understanding of the plan of care.

## 2025-04-23 NOTE — TELEPHONE ENCOUNTER
Patient called to see if provider could read her ECG results from 4/8/25?    Patient is also still feeling dizzy and with her high blood pressure, she is requesting a referral to a cardiologist.      Can a referral be written for her?    Patient is requesting a call back with results and to let her know a referral has been written.  A message is fine.    Please advise, thank you.    Tracee: [phone]

## 2025-04-23 NOTE — TELEPHONE ENCOUNTER
Patient called to request a refill of the following medication:    buPROPion (WELLBUTRIN XL) 150 mg 24 hr tablet   Take 1 tablet (150 mg total) by mouth daily     Medication [facility] be sent to [facility] [place] [facility] [place] [place] [facility] [address] [phone]     Please advise, thank you.

## 2025-04-23 NOTE — TELEPHONE ENCOUNTER
HR 50 on EKG so would recommend decrease atenolol to 25mg daily and monitor BP - may need to adjust meds for BP  I placed cardio referral

## 2025-04-24 NOTE — TELEPHONE ENCOUNTER
I would use what she has and cut them in half   She will need to monitor BP and HR - she should stop in office next week for reading at our office with nurse

## 2025-04-28 NOTE — TELEPHONE ENCOUNTER
Pt called in reporting she has still been taking the 50 mg of her atenolol, states she never received an update if she was to cut tablets in half or not?    Relayed to pt per Dr. Vyas she is to cut the tablets in half. Pt understood and will do that.     Pt then states she has been taking 400 mg of Magnesium for the past 4 days, and the dizziness is gone, but he BP has gone up to between 160-170.    Lastly, pt states she was le to able to schedule with Cardiology, but her appt isn't until July. Inquires if Dr. Vyas would want her in sooner?    Please advise & call pt back with update. Thank you!    Tracee: [phone]

## 2025-04-28 NOTE — TELEPHONE ENCOUNTER
Needs to cut the atenolol in half due to lower HR - that may correct it   Should monitor and have her come in for bp check on that dose for at least a week  Magnesium should not affect BP If Bp running higher would increase Lisinopril to 40mg daily

## 2025-04-29 ENCOUNTER — DOCUMENTATION (OUTPATIENT)
Dept: FAMILY MEDICINE CLINIC | Facility: CLINIC | Age: 70
End: 2025-04-29

## 2025-04-30 ENCOUNTER — DOCUMENTATION (OUTPATIENT)
Dept: FAMILY MEDICINE CLINIC | Facility: CLINIC | Age: 70
End: 2025-04-30

## 2025-04-30 VITALS — DIASTOLIC BLOOD PRESSURE: 58 MMHG | SYSTOLIC BLOOD PRESSURE: 118 MMHG | HEART RATE: 62 BPM

## 2025-05-02 ENCOUNTER — DOCUMENTATION (OUTPATIENT)
Dept: FAMILY MEDICINE CLINIC | Facility: CLINIC | Age: 70
End: 2025-05-02

## 2025-05-02 VITALS — SYSTOLIC BLOOD PRESSURE: 134 MMHG | HEART RATE: 65 BPM | DIASTOLIC BLOOD PRESSURE: 62 MMHG

## 2025-05-02 DIAGNOSIS — I10 PRIMARY HYPERTENSION: Primary | ICD-10-CM

## 2025-05-02 RX ORDER — ATENOLOL 25 MG/1
25 TABLET ORAL DAILY
Qty: 90 TABLET | Refills: 0 | Status: SHIPPED | OUTPATIENT
Start: 2025-05-02

## 2025-06-09 ENCOUNTER — OFFICE VISIT (OUTPATIENT)
Dept: FAMILY MEDICINE CLINIC | Facility: CLINIC | Age: 70
End: 2025-06-09
Payer: COMMERCIAL

## 2025-06-09 VITALS
HEART RATE: 71 BPM | OXYGEN SATURATION: 97 % | HEIGHT: 60 IN | TEMPERATURE: 97.4 F | WEIGHT: 126 LBS | RESPIRATION RATE: 18 BRPM | SYSTOLIC BLOOD PRESSURE: 126 MMHG | BODY MASS INDEX: 24.74 KG/M2 | DIASTOLIC BLOOD PRESSURE: 74 MMHG

## 2025-06-09 DIAGNOSIS — F41.9 ANXIETY: ICD-10-CM

## 2025-06-09 DIAGNOSIS — R73.9 HYPERGLYCEMIA: Primary | ICD-10-CM

## 2025-06-09 DIAGNOSIS — I10 PRIMARY HYPERTENSION: ICD-10-CM

## 2025-06-09 PROCEDURE — 99214 OFFICE O/P EST MOD 30 MIN: CPT | Performed by: INTERNAL MEDICINE

## 2025-06-09 RX ORDER — ALPRAZOLAM 0.5 MG
0.5 TABLET ORAL
Qty: 30 TABLET | Refills: 0 | Status: SHIPPED | OUTPATIENT
Start: 2025-06-09

## 2025-06-09 RX ORDER — ATENOLOL 25 MG/1
25 TABLET ORAL DAILY
Qty: 90 TABLET | Refills: 0 | Status: SHIPPED | OUTPATIENT
Start: 2025-06-09

## 2025-06-09 NOTE — PROGRESS NOTES
Name: Tracee Valencia      : 1955      MRN: [id number]  Encounter Provider: Kamilla Vyas DO  Encounter Date: 2025   Encounter department: [facility]  :  Assessment & Plan  Primary hypertension    Orders:    atenolol (TENORMIN) 25 mg tablet; Take 1 tablet (25 mg total) by mouth daily  Lo sodium diet Continue lower dose atenolol Pt has Cardio followup in July   Anxiety    Orders:    ALPRAZolam (XANAX) 0.5 mg tablet; Take 1 tablet (0.5 mg total) by mouth daily at bedtime as needed for anxiety  Pt takes prn and benefits when needed   Hyperglycemia    Orders:    Hemoglobin A1C; Future    Comprehensive metabolic panel; Future  Lo carb diet and regular exercise encouraged Monitor A1c next visit         Depression Screening and Follow-up Plan: Patient was screened for depression during today's encounter. They screened negative with a PHQ-2 score of 0.      Urinary Incontinence Plan of Care: counseling topics discussed: limiting fluid intake 3-4 hours before bed.     Rto 6months/prn  History of Present Illness   HPI  Pt feels better overall No dizziness and less fatigue She is back to baseline level of activity She has not been monitoring Bp as she has felt better Still working fulltime at [employer] She does have Cardiology appt in July and plans to keep that No chest pain or sob No cough She has had some allergy sxs but managed   Review of Systems   Constitutional:  Negative for chills and fever.   HENT: Negative.     Eyes:  Negative for visual disturbance.   Respiratory:  Negative for cough and shortness of breath.    Cardiovascular: Negative.    Gastrointestinal: Negative.    Genitourinary: Negative.    Musculoskeletal:  Positive for arthralgias.   Neurological:  Negative for dizziness, light-headedness and headaches.   Psychiatric/Behavioral:  Negative for sleep disturbance. The patient is not nervous/anxious.      Past Medical History[1]  Past Surgical History[2]  Social History      Socioeconomic History    Marital status:      Spouse name: Not on file    Number of children: Not on file    Years of education: Not on file    Highest education level: Not on file   Occupational History    Not on file   Tobacco Use    Smoking status: Never    Smokeless tobacco: Never   Vaping Use    Vaping status: Never Used   Substance and Sexual Activity    Alcohol use: Yes     Comment: 3x per week wine or beer    Drug use: No    Sexual activity: Not on file   Other Topics Concern    Not on file   Social History Narrative    Dental care occasionally    Lives with spouse    No advance directives    No caffeine use    Sun screen worn    Uses seatbelt     Social Drivers of Health     Financial Resource Strain: Not on file   Food Insecurity: Not on file   Transportation Needs: Not on file   Physical Activity: Not on file   Stress: Not on file   Social Connections: Not on file   Intimate Partner Violence: Not on file   Housing Stability: Not on file     No Known Allergies    Objective   /74   Pulse 71   Temp (!) 97.4 °F (36.3 °C) (Temporal)   Resp 18   Ht 5' (1.524 m)   Wt 57.2 kg (126 lb)   SpO2 97%   BMI 24.61 kg/m²      Physical Exam  Vitals and nursing note reviewed.   Constitutional:       General: She is not in acute distress.     Appearance: Normal appearance. She is not ill-appearing, toxic-appearing or diaphoretic.   HENT:      Head: Normocephalic and atraumatic.      Right Ear: External ear normal.      Left Ear: External ear normal.      Nose: Nose normal.      Mouth/Throat:      Mouth: Mucous membranes are moist.     Eyes:      General: No scleral icterus.     Extraocular Movements: Extraocular movements intact.      Conjunctiva/sclera: Conjunctivae normal.      Pupils: Pupils are equal, round, and reactive to light.       Cardiovascular:      Rate and Rhythm: Normal rate and regular rhythm.      Pulses: Normal pulses.      Heart sounds: Normal heart sounds.   Pulmonary:       Effort: Pulmonary effort is normal. No respiratory distress.      Breath sounds: Normal breath sounds. No wheezing.   Abdominal:      General: There is no distension.      Palpations: Abdomen is soft.      Tenderness: There is no abdominal tenderness.     Musculoskeletal:      Cervical back: Normal range of motion and neck supple.      Right lower leg: No edema.      Left lower leg: No edema.   Lymphadenopathy:      Cervical: No cervical adenopathy.     Skin:     General: Skin is warm and dry.      Coloration: Skin is not jaundiced or pale.     Neurological:      General: No focal deficit present.      Mental Status: She is alert and oriented to person, place, and time. Mental status is at baseline.     Psychiatric:         Mood and Affect: Mood normal.         Behavior: Behavior normal.         Thought Content: Thought content normal.         Judgment: Judgment normal.                [1]   Past Medical History:  Diagnosis Date    Acute left-sided low back pain without sciatica 6/15/2021    CPAP (continuous positive airway pressure) dependence     DJD (degenerative joint disease) of cervical spine     History of COVID-19 10/03/2022    Hyperglycemia     last assessed 16    Hypertension     Medullary sponge kidney     Sleep apnea    [2]   Past Surgical History:  Procedure Laterality Date    CATARACT EXTRACTION Left 2011     SECTION      unknown onset    CYSTOSCOPY W/ URETERAL STENT PLACEMENT Right     CYSTOSCOPY W/ URETERAL STENT REMOVAL Right 10/27/2015    right stent    DILATION AND CURETTAGE OF UTERUS      of cervical stump unknown onset    OOPHORECTOMY Left     unknown onset    NM RPR UMBILICAL HRNA 5 YRS/> REDUCIBLE N/A 10/23/2018    Procedure: REPAIR HERNIA UMBILICAL;  Surgeon: Williams Hernandez DO;  Location: [facility];  Service: General    TOTAL ABDOMINAL HYSTERECTOMY      w/ removal of both ovaries, last assessed 14

## 2025-06-09 NOTE — ASSESSMENT & PLAN NOTE
Orders:    atenolol (TENORMIN) 25 mg tablet; Take 1 tablet (25 mg total) by mouth daily  Lo sodium diet Continue lower dose atenolol Pt has Cardio followup in July

## 2025-06-09 NOTE — ASSESSMENT & PLAN NOTE
Orders:    ALPRAZolam (XANAX) 0.5 mg tablet; Take 1 tablet (0.5 mg total) by mouth daily at bedtime as needed for anxiety  Pt takes prn and benefits when needed

## 2025-07-16 ENCOUNTER — CONSULT (OUTPATIENT)
Dept: CARDIOLOGY CLINIC | Facility: HOSPITAL | Age: 70
End: 2025-07-16
Payer: COMMERCIAL

## 2025-07-16 VITALS
HEIGHT: 60 IN | SYSTOLIC BLOOD PRESSURE: 116 MMHG | WEIGHT: 125.8 LBS | HEART RATE: 65 BPM | DIASTOLIC BLOOD PRESSURE: 70 MMHG | BODY MASS INDEX: 24.7 KG/M2

## 2025-07-16 DIAGNOSIS — R00.1 BRADYCARDIA: ICD-10-CM

## 2025-07-16 DIAGNOSIS — I10 PRIMARY HYPERTENSION: Primary | ICD-10-CM

## 2025-07-16 DIAGNOSIS — E78.2 MIXED HYPERLIPIDEMIA: ICD-10-CM

## 2025-07-16 PROCEDURE — 93000 ELECTROCARDIOGRAM COMPLETE: CPT | Performed by: INTERNAL MEDICINE

## 2025-07-16 PROCEDURE — 99214 OFFICE O/P EST MOD 30 MIN: CPT | Performed by: INTERNAL MEDICINE

## 2025-07-16 NOTE — PROGRESS NOTES
Cardiology Consultation     Tracee Valencia  [id number]  1955  [facility] [address] [address]      Diagnoses and all orders for this visit:    Primary hypertension  -     POCT ECG  -     Echo complete w/ contrast if indicated; Future  -     Stress test only, exercise; Future    Bradycardia  -     POCT ECG  -     Ambulatory Referral to Cardiology  -     Echo complete w/ contrast if indicated; Future  -     Stress test only, exercise; Future    Mixed hyperlipidemia  -     Stress test only, exercise; Future        I had the pleasure of seeing Tracee Valencia for a consultation regarding an episode of 'dizziness and slow heart rate' requested by     History of the Presenting Illness, Discussion/Summary and my Plan are as follows:::    Tracee is a pleasant 69-year-old lady who looks much younger than her age, with a history of hypertension since around the age of 41,-chronically been on atenolol and lisinopril, and mild mixed dyslipidemia and impaired fasting glucose and with no prior history of tobacco use.  She also carries a prior history of mitral valve prolapse but was later told that she did not have it.    Family history-mother had a stroke at 57, who is also a smoker and a prediabetic.  Father  of pancreatic cancer at 61, she is an only child.    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    Around the end of 2025, developed classic vertigo-spinning sensation and threw up as well, in that setting had sought attention and initially lisinopril dose was increased for blood pressures, subsequently in 2025, ECG showed sinus bradycardia and her atenolol dose was decreased.  She had been on atenolol chronically for many years.  She continued to have what she  calls a dizzy sensation for a few weeks after, around that time had also lost about 5 to 6 pounds.    Today her vitals are normal and stable and she denies any cardiac symptoms such as chest pains or shortness of breath.    Normal cardiac exam,    Plan:    Hypertension: Well-controlled, normal heart rate and blood pressures, no changes to medications at this time  Check echo for completeness    Health maintenance: She is physically very active with controlled hypertension and mild untreated mixed dyslipidemia as well as a family history of premature vascular disease-mother although was also a smoker, considering that she plans to continue to stay very active, we will check an exercise treadmill stress test to rule out any existing/severe disease.    Mild mixed dyslipidemia: Information about lowering triglycerides/mixed lipidemia was provided in printed format.  Can simply be checked through you.    If testing is negative, she will follow-up once in 6 months and  As needed thereafter             Latest Reference Range & Units 04/23/25 06:38   Hemoglobin 11.5 - 15.4 g/dL 13.6   Hematocrit 34.8 - 46.1 % 39.7      Latest Reference Range & Units 04/23/25 18:50   Hemoglobin A1C Normal 4.0-5.6%; PreDiabetic 5.7-6.4%; Diabetic >=6.5%; Glycemic control for adults with diabetes <7.0% % 5.8 (H)   eAG, EST AVG Glucose mg/dl 120   (H): Data is abnormally high     Latest Reference Range & Units 04/23/25 06:38   BUN 5 - 25 mg/dL 22   Creatinine 0.60 - 1.30 mg/dL 0.86        Latest Reference Range & Units 04/23/25 08:51   Cholesterol See Comment mg/dL 192   Triglycerides See Comment mg/dL 168 (H)   HDL >=50 mg/dL 51   Non-HDL Cholesterol mg/dl 141   LDL Calculated 0 - 100 mg/dL 107 (H)   (H): Data is abnormally high    1. Primary hypertension  POCT ECG    Echo complete w/ contrast if indicated    Stress test only, exercise      2. Bradycardia  POCT ECG    Ambulatory Referral to Cardiology    Echo complete w/ contrast if indicated     Stress test only, exercise      3. Mixed hyperlipidemia  Stress test only, exercise        Problem List[1]  Past Medical History[2]  Social History     Socioeconomic History    Marital status:      Spouse name: Not on file    Number of children: Not on file    Years of education: Not on file    Highest education level: Not on file   Occupational History    Not on file   Tobacco Use    Smoking status: Never    Smokeless tobacco: Never   Vaping Use    Vaping status: Never Used   Substance and Sexual Activity    Alcohol use: Yes     Comment: 3x per week wine or beer    Drug use: No    Sexual activity: Not on file   Other Topics Concern    Not on file   Social History Narrative    Dental care occasionally    Lives with spouse    No advance directives    No caffeine use    Sun screen worn    Uses seatbelt     Social Drivers of Health     Financial Resource Strain: Not on file   Food Insecurity: Not on file   Transportation Needs: Not on file   Physical Activity: Not on file   Stress: Not on file   Social Connections: Not on file   Intimate Partner Violence: Not on file   Housing Stability: Not on file      Family History[3]  Past Surgical History[4]  Current Medications[5]  No Known Allergies  Vitals:    07/16/25 1435   BP: 116/70   Pulse: 65   Weight: 57.1 kg (125 lb 12.8 oz)   Height: 5' (1.524 m)         Imaging: No results found.    Review of Systems:  Review of Systems   Constitutional: Negative.    HENT: Negative.     Eyes: Negative.    Respiratory: Negative.     Cardiovascular: Negative.    Musculoskeletal: Negative.        Physical Exam:    /70   Pulse 65   Ht 5' (1.524 m)   Wt 57.1 kg (125 lb 12.8 oz)   BMI 24.57 kg/m²   Physical Exam  Constitutional:       General: She is not in acute distress.     Appearance: She is not ill-appearing, toxic-appearing or diaphoretic.   HENT:      Nose: Nose normal. No congestion or rhinorrhea.   Neck:      Vascular: No carotid bruit.     Cardiovascular:       "Rate and Rhythm: Normal rate and regular rhythm.      Heart sounds: No murmur heard.     No friction rub. No gallop.   Pulmonary:      Effort: Pulmonary effort is normal. No respiratory distress.      Breath sounds: No stridor. No wheezing or rhonchi.     Musculoskeletal:         General: No swelling, tenderness, deformity or signs of injury. Normal range of motion.      Cervical back: Normal range of motion. No rigidity or tenderness.   Lymphadenopathy:      Cervical: No cervical adenopathy.     Neurological:      Mental Status: She is alert.            This note was completed in part utilizing [employer] direct voice recognition software.   Grammatical errors, random word insertion, spelling mistakes, occasional wrong word or \"sound-alike\" substitutions and incomplete sentences may be an occasional consequence of the system secondary to software limitations, ambient noise and hardware issues. At the time of dictation, efforts were made to edit, clarify and /or correct errors.  Please read the chart carefully and recognize, using context, where substitutions have occurred.  If you have any questions or concerns about the context, text or information contained within the body of this dictation, please contact myself, the provider, for further clarification.       [1]   Patient Active Problem List  Diagnosis    Anxiety    Hypertension    Esophageal reflux    Nephrolithiasis    Osteoporosis    Annual physical exam    GABRIELLE (obstructive sleep apnea)   [2]   Past Medical History:  Diagnosis Date    Acute left-sided low back pain without sciatica 6/15/2021    CPAP (continuous positive airway pressure) dependence     DJD (degenerative joint disease) of cervical spine     History of COVID-19 10/03/2022    Hyperglycemia     last assessed 4/14/16    Hypertension     Medullary sponge kidney     Sleep apnea    [3]   Family History  Problem Relation Name Age of Onset    Coronary artery disease Mother      Hypertension Mother "      Stroke Mother          syndrome    Diabetes Family      Pancreatic cancer Father      No Known Problems Daughter      No Known Problems Maternal Grandmother      No Known Problems Maternal Grandfather      Endometrial cancer Paternal Grandmother      Leukemia Paternal Grandfather      Lung cancer Paternal Aunt      Lung cancer Paternal Uncle     [4]   Past Surgical History:  Procedure Laterality Date    CATARACT EXTRACTION Left 2011     SECTION      unknown onset    CYSTOSCOPY W/ URETERAL STENT PLACEMENT Right     CYSTOSCOPY W/ URETERAL STENT REMOVAL Right 10/27/2015    right stent    DILATION AND CURETTAGE OF UTERUS      of cervical stump unknown onset    OOPHORECTOMY Left     unknown onset    OK RPR UMBILICAL HRNA 5 YRS/> REDUCIBLE N/A 10/23/2018    Procedure: REPAIR HERNIA UMBILICAL;  Surgeon: Williams Hernandez DO;  Location: [facility];  Service: General    TOTAL ABDOMINAL HYSTERECTOMY      w/ removal of both ovaries, last assessed 14   [5]   Current Outpatient Medications:     ALPRAZolam (XANAX) 0.5 mg tablet, Take 1 tablet (0.5 mg total) by mouth daily at bedtime as needed for anxiety, Disp: 30 tablet, Rfl: 0    Ascorbic Acid (vitamin C) 100 MG tablet, Take 600 mg by mouth in the morning and 600 mg before bedtime., Disp: , Rfl:     atenolol (TENORMIN) 25 mg tablet, Take 1 tablet (25 mg total) by mouth daily, Disp: 90 tablet, Rfl: 0    buPROPion (WELLBUTRIN XL) 150 mg 24 hr tablet, Take 1 tablet (150 mg total) by mouth daily, Disp: 90 tablet, Rfl: 1    calcium carbonate (OS-JUDY) 600 MG tablet, Take 600 mg by mouth in the morning., Disp: , Rfl:     Cholecalciferol (VITAMIN D3) 2000 units capsule, Take 5,000 Units by mouth, Disp: , Rfl:     lisinopril (ZESTRIL) 20 mg tablet, Take 1 tablet (20 mg total) by mouth daily, Disp: 90 tablet, Rfl: 3

## 2025-08-17 DIAGNOSIS — I10 PRIMARY HYPERTENSION: ICD-10-CM

## 2025-08-18 RX ORDER — ATENOLOL 25 MG/1
25 TABLET ORAL DAILY
Qty: 90 TABLET | Refills: 1 | Status: SHIPPED | OUTPATIENT
Start: 2025-08-18

## 2025-08-20 DIAGNOSIS — F41.9 ANXIETY: ICD-10-CM

## 2025-08-21 RX ORDER — ALPRAZOLAM 0.5 MG
0.5 TABLET ORAL
Qty: 30 TABLET | Refills: 0 | Status: SHIPPED | OUTPATIENT
Start: 2025-08-21

## (undated) DEVICE — INTENDED FOR TISSUE SEPARATION, AND OTHER PROCEDURES THAT REQUIRE A SHARP SURGICAL BLADE TO PUNCTURE OR CUT.: Brand: BARD-PARKER SAFETY BLADES SIZE 15, STERILE

## (undated) DEVICE — GAUZE SPONGES,16 PLY: Brand: CURITY

## (undated) DEVICE — PENROSE DRAIN, 18 X 3 8: Brand: CARDINAL HEALTH

## (undated) DEVICE — GLOVE INDICATOR PI UNDERGLOVE SZ 7 BLUE

## (undated) DEVICE — CHLORAPREP HI-LITE 26ML ORANGE

## (undated) DEVICE — SUT VICRYL 3-0 SH 27 IN J416H

## (undated) DEVICE — MEDI-VAC YANK SUCT HNDL W/TPRD BULBOUS TIP: Brand: CARDINAL HEALTH

## (undated) DEVICE — BETHLEHEM UNIVERSAL MINOR GEN: Brand: CARDINAL HEALTH

## (undated) DEVICE — SUT MONOCRYL 4-0 PS-2 27 IN Y426H

## (undated) DEVICE — NEEDLE 25G X 1 1/2

## (undated) DEVICE — REM POLYHESIVE ADULT PATIENT RETURN ELECTRODE: Brand: VALLEYLAB

## (undated) DEVICE — PLUMEPEN PRO 10FT

## (undated) DEVICE — 3M™ STERI-STRIP™ REINFORCED ADHESIVE SKIN CLOSURES, R1546, 1/4 IN X 4 IN (6 MM X 100 MM), 10 STRIPS/ENVELOPE: Brand: 3M™ STERI-STRIP™

## (undated) DEVICE — SUT PROLENE 1 CT-1 30 IN 8425H

## (undated) DEVICE — SYRINGE 10ML LL

## (undated) DEVICE — GLOVE SRG BIOGEL ECLIPSE 7

## (undated) DEVICE — SUT VICRYL 2-0 SH 27 IN UNDYED J417H

## (undated) DEVICE — 3M™ TEGADERM™ TRANSPARENT FILM DRESSING FRAME STYLE, 1626W, 4 IN X 4-3/4 IN (10 CM X 12 CM), 50/CT 4CT/CASE: Brand: 3M™ TEGADERM™

## (undated) DEVICE — TUBING SUCTION 5MM X 12 FT